# Patient Record
Sex: MALE | Race: WHITE | URBAN - METROPOLITAN AREA
[De-identification: names, ages, dates, MRNs, and addresses within clinical notes are randomized per-mention and may not be internally consistent; named-entity substitution may affect disease eponyms.]

---

## 2024-05-28 ENCOUNTER — OFFICE VISIT (OUTPATIENT)
Dept: ORTHOPEDIC SURGERY | Age: 59
End: 2024-05-28
Payer: COMMERCIAL

## 2024-05-28 VITALS — HEIGHT: 75 IN | BODY MASS INDEX: 34.82 KG/M2 | WEIGHT: 280 LBS

## 2024-05-28 DIAGNOSIS — S89.91XA INJURY OF RIGHT KNEE, INITIAL ENCOUNTER: Primary | ICD-10-CM

## 2024-05-28 DIAGNOSIS — S76.111A QUADRICEPS TENDON RUPTURE, RIGHT, INITIAL ENCOUNTER: ICD-10-CM

## 2024-05-28 DIAGNOSIS — S76.109A INJURY OF QUADRICEPS TENDON: ICD-10-CM

## 2024-05-28 PROCEDURE — 99204 OFFICE O/P NEW MOD 45 MIN: CPT | Performed by: ORTHOPAEDIC SURGERY

## 2024-05-28 RX ORDER — AMOXICILLIN AND CLAVULANATE POTASSIUM 875; 125 MG/1; MG/1
1 TABLET, FILM COATED ORAL 2 TIMES DAILY
Qty: 28 TABLET | Refills: 0 | Status: SHIPPED | OUTPATIENT
Start: 2024-05-28 | End: 2024-06-11

## 2024-05-28 NOTE — PROGRESS NOTES
Name: Juan Briggs  YOB: 1965  Gender: male  MRN: [id number]    CC: Right knee injury    HPI:   2024: Right knee injury  2024: [facility] ED  2024: Initial visit: Right knee injury     ROS/Meds/PSH/PMH/FH/SH: reviewed today    Tobacco:  reports that he has never smoked. He has never used smokeless tobacco.     Physical Examination:  Patient appears to be alert and oriented with acceptable appearance.  No obvious distress or SOB  CV: appears to have acceptable vascular color and capillary refill  Neuro: appears to have mostly intact light touch sensation   Skin: Right = anterior knee patella soft tissue swelling; mild redness   MS: Standing: Plantigrade: Gait protected  Right = quadriceps tendon deficiency; palpable gap; unable to independently straighten his knee    XR: No new x-rays taken today  XR Impression:  As above      Reviewed Test/Records/Documents:   2024: [facility] ED: Note reviewed  2024: [facility] ED: Left knee x-ray: Radiology impression: No evidence of acute bony abnormality: New prepatellar soft tissue swelling: No evidence of acute patella fracture: No dislocation patellofemoral groove  2024: Right knee ultrasound: Radiology impression: Normal sonographic appearance of visualized structures: No obvious patella tendon disruption or rupture     Assessment:    Right knee injury: Quadriceps tendon disruption    Plan:   The patient and I discussed the above assessment. We explored treatment options.     He unfortunately has a quadriceps tendon disruption that needs surgical reconstruction  With this anterior knee bursal swelling and mild redness, prescribed antibiotics as a precaution    Advanced medical imaging: Right knee MRI scan: Stat: Assess quadriceps tendon injury/tear    DME: He has crutches and knee immobilizer  We discussed knee care and protection  PT: Will need a postop   Handicap parkin months  Medication - OTC meds

## 2024-05-30 ENCOUNTER — OFFICE VISIT (OUTPATIENT)
Dept: ORTHOPEDIC SURGERY | Age: 59
End: 2024-05-30
Payer: COMMERCIAL

## 2024-05-30 DIAGNOSIS — S89.91XA INJURY OF RIGHT KNEE, INITIAL ENCOUNTER: ICD-10-CM

## 2024-05-30 DIAGNOSIS — S76.111A QUADRICEPS TENDON RUPTURE, RIGHT, INITIAL ENCOUNTER: Primary | ICD-10-CM

## 2024-05-30 DIAGNOSIS — S76.109A INJURY OF QUADRICEPS TENDON: ICD-10-CM

## 2024-05-30 PROCEDURE — 99204 OFFICE O/P NEW MOD 45 MIN: CPT | Performed by: ORTHOPAEDIC SURGERY

## 2024-05-30 PROCEDURE — L1833 KO ADJ JNT POS R SUP PRE OTS: HCPCS | Performed by: ORTHOPAEDIC SURGERY

## 2024-05-30 NOTE — PROGRESS NOTES
Name: Juan Briggs  YOB: 1965  Gender: male  MRN: [id number]      CC: Knee Pain (R)       HPI: Juan Briggs is a 58 y.o. male who is referred by Dr. Briscoe for surgical consultation of his right knee.  Suffered a fall 4 days ago had acute pop in the anterior aspect of his right knee inability to walk had some swelling.  He was seen at [facility] had an ultrasound was diagnosed with a partial patellar tendon or quad tendon rupture.  He was seen by Dr. Briscoe had concern for quad tendon rupture so he was sent for an MRI.  Presents today for definitive management.      Current Outpatient Medications:     amoxicillin-clavulanate (AUGMENTIN) 875-125 MG per tablet, Take 1 tablet by mouth 2 times daily for 14 days, Disp: 28 tablet, Rfl: 0  No Known Allergies  No past medical history on file.  No past surgical history on file.  No family history on file.  Social History     Socioeconomic History    Marital status: Unknown     Spouse name: Not on file    Number of children: Not on file    Years of education: Not on file    Highest education level: Not on file   Occupational History    Not on file   Tobacco Use    Smoking status: Never    Smokeless tobacco: Never   Substance and Sexual Activity    Alcohol use: Not on file    Drug use: Not on file    Sexual activity: Not on file   Other Topics Concern    Not on file   Social History Narrative    Not on file     Social Determinants of Health     Financial Resource Strain: Not on file   Food Insecurity: Not on file   Transportation Needs: Not on file   Physical Activity: Not on file   Stress: Not on file   Social Connections: Not on file   Intimate Partner Violence: Not on file   Housing Stability: Not on file            Physical Examination:  General: no acute distress  HEENT NCAT  Lungs: breathing easily  CV: regular rhythm by pulse  Abd: soft ND  Right Knee: Large effusion right knee palpable defect quadriceps insertion.  Large extensor lag with

## 2024-05-30 NOTE — H&P (VIEW-ONLY)
Name: Juan Briggs  YOB: 1965  Gender: male  MRN: [id number]      CC: Knee Pain (R)       HPI: Juan Briggs is a 58 y.o. male who is referred by Dr. Briscoe for surgical consultation of his right knee.  Suffered a fall 4 days ago had acute pop in the anterior aspect of his right knee inability to walk had some swelling.  He was seen at [facility] had an ultrasound was diagnosed with a partial patellar tendon or quad tendon rupture.  He was seen by Dr. Briscoe had concern for quad tendon rupture so he was sent for an MRI.  Presents today for definitive management.      Current Outpatient Medications:     amoxicillin-clavulanate (AUGMENTIN) 875-125 MG per tablet, Take 1 tablet by mouth 2 times daily for 14 days, Disp: 28 tablet, Rfl: 0  No Known Allergies  No past medical history on file.  No past surgical history on file.  No family history on file.  Social History     Socioeconomic History    Marital status: Unknown     Spouse name: Not on file    Number of children: Not on file    Years of education: Not on file    Highest education level: Not on file   Occupational History    Not on file   Tobacco Use    Smoking status: Never    Smokeless tobacco: Never   Substance and Sexual Activity    Alcohol use: Not on file    Drug use: Not on file    Sexual activity: Not on file   Other Topics Concern    Not on file   Social History Narrative    Not on file     Social Determinants of Health     Financial Resource Strain: Not on file   Food Insecurity: Not on file   Transportation Needs: Not on file   Physical Activity: Not on file   Stress: Not on file   Social Connections: Not on file   Intimate Partner Violence: Not on file   Housing Stability: Not on file            Physical Examination:  General: no acute distress  HEENT NCAT  Lungs: breathing easily  CV: regular rhythm by pulse  Abd: soft ND  Right Knee: Large effusion right knee palpable defect quadriceps insertion.  Large extensor lag with

## 2024-05-30 NOTE — PROGRESS NOTES
The brace was properly aligned medially and laterally along the length of the right Knee with the extension/flexion dials placed slightly above the patella (to insure that if brace slides down slightly the dials will still line up on either side of the patella/knee region). The brace is extended/shorten to the patient's leg length and to insure proper fit of the brace. The straps are tightened starting with the most distal strap and then strap proximal to the patella. The strap right below the patella is then secured and last is the strap highest on the quad. The straps are then trimmed for easier tightening of the brace for the patient. Patient read and signed documenting they understand and agree to [facility]'s current DME return policy.

## 2024-05-31 DIAGNOSIS — S76.109A INJURY OF QUADRICEPS TENDON: ICD-10-CM

## 2024-05-31 DIAGNOSIS — S76.111A QUADRICEPS MUSCLE RUPTURE, RIGHT, INITIAL ENCOUNTER: ICD-10-CM

## 2024-05-31 DIAGNOSIS — S89.91XA INJURY OF RIGHT KNEE, INITIAL ENCOUNTER: ICD-10-CM

## 2024-05-31 DIAGNOSIS — S76.111A QUADRICEPS TENDON RUPTURE, RIGHT, INITIAL ENCOUNTER: Primary | ICD-10-CM

## 2024-05-31 DIAGNOSIS — S89.91XA INJURY OF KNEE, RIGHT, INITIAL ENCOUNTER: ICD-10-CM

## 2024-05-31 NOTE — PRE-PROCEDURE INSTRUCTIONS
Patient verified name and .  Order for consent not found in EHR and matches case posting; patient verifies procedure.   Type lB surgery, phone assessment complete.  Orders not received.  Labs per surgeon: none at present time  Labs per anesthesia protocol: none    Patient answered medical/surgical history questions at their best of ability. All prior to admission medications documented in EPIC.    Patient instructed to continue taking all prescription medications up to the day of surgery but to take only the following medications the day of surgery according to anesthesia guidelines with a small sip of water: Augmentin   Also, patient is requested to take 2 Tylenol in the morning and then again before bed on the day before surgery. Regular or extra strength may be used.       Patient informed that all vitamins and supplements should be held 7 days prior to surgery and NSAIDS 5 days prior to surgery.   Prescription meds to hold:none    Patient instructed on the following:    > Arrive at [facility] Entrance, time of arrival to be called the day before by 1700  > NPO after midnight, unless otherwise indicated, including gum, mints, and ice chips  > Responsible adult must drive patient to the hospital, stay during surgery, and patient will need supervision 24 hours after anesthesia  > Use non moisturizing soap in shower the night before surgery and on the morning of surgery  > All piercings must be removed prior to arrival.    > Leave all valuables (money and jewelry) at home but bring insurance card and ID on DOS.   > You may be required to pay a deductible or co-pay on the day of your procedure. You can pre-pay by calling [phone] if your surgery is at the [facility] or [phone] if your surgery is at the [facility].  > Do not wear make-up, nail polish, lotions, cologne, perfumes, powders, or oil on skin. Artificial nails are not permitted.

## 2024-06-03 DIAGNOSIS — S76.111A QUADRICEPS TENDON RUPTURE, RIGHT, INITIAL ENCOUNTER: Primary | ICD-10-CM

## 2024-06-03 RX ORDER — OXYCODONE HYDROCHLORIDE 5 MG/1
5 TABLET ORAL EVERY 4 HOURS PRN
Qty: 30 TABLET | Refills: 0 | Status: SHIPPED | OUTPATIENT
Start: 2024-06-03 | End: 2024-06-08

## 2024-06-04 ENCOUNTER — ANESTHESIA EVENT (OUTPATIENT)
Dept: SURGERY | Age: 59
End: 2024-06-04
Payer: COMMERCIAL

## 2024-06-04 NOTE — PERIOP NOTE
Preop department called to notify patient of arrival time for scheduled procedure. Instructions given to   - Arrive at OPC Entrance [address].  - Remain NPO after midnight, unless otherwise indicated, including gum, mints, and ice chips.   - Have a responsible adult to drive patient to the hospital, stay during surgery, and patient will need supervision 24 hours after anesthesia.   - Use antibacterial soap in shower the night before surgery and on the morning of surgery.       Was patient contacted: yes  Voicemail left:   Numbers contacted: [phone]   Arrival time: 0600

## 2024-06-05 ENCOUNTER — HOSPITAL ENCOUNTER (OUTPATIENT)
Age: 59
Setting detail: OUTPATIENT SURGERY
Discharge: HOME | End: 2024-06-05
Attending: ORTHOPAEDIC SURGERY | Admitting: ORTHOPAEDIC SURGERY
Payer: COMMERCIAL

## 2024-06-05 ENCOUNTER — ANESTHESIA (OUTPATIENT)
Dept: SURGERY | Age: 59
End: 2024-06-05
Payer: COMMERCIAL

## 2024-06-05 VITALS
HEART RATE: 71 BPM | BODY MASS INDEX: 34.82 KG/M2 | TEMPERATURE: 98.2 F | OXYGEN SATURATION: 94 % | SYSTOLIC BLOOD PRESSURE: 164 MMHG | DIASTOLIC BLOOD PRESSURE: 82 MMHG | WEIGHT: 280 LBS | HEIGHT: 75 IN | RESPIRATION RATE: 12 BRPM

## 2024-06-05 PROCEDURE — 7100000000 HC PACU RECOVERY - FIRST 15 MIN: Performed by: ORTHOPAEDIC SURGERY

## 2024-06-05 PROCEDURE — 6360000002 HC RX W HCPCS

## 2024-06-05 PROCEDURE — 6360000002 HC RX W HCPCS: Performed by: ORTHOPAEDIC SURGERY

## 2024-06-05 PROCEDURE — 6370000000 HC RX 637 (ALT 250 FOR IP): Performed by: ANESTHESIOLOGY

## 2024-06-05 PROCEDURE — 7100000001 HC PACU RECOVERY - ADDTL 15 MIN: Performed by: ORTHOPAEDIC SURGERY

## 2024-06-05 PROCEDURE — 6360000002 HC RX W HCPCS: Performed by: PHYSICIAN ASSISTANT

## 2024-06-05 PROCEDURE — 2580000003 HC RX 258: Performed by: ANESTHESIOLOGY

## 2024-06-05 PROCEDURE — 3700000001 HC ADD 15 MINUTES (ANESTHESIA): Performed by: ORTHOPAEDIC SURGERY

## 2024-06-05 PROCEDURE — 2709999900 HC NON-CHARGEABLE SUPPLY: Performed by: ORTHOPAEDIC SURGERY

## 2024-06-05 PROCEDURE — 3600000014 HC SURGERY LEVEL 4 ADDTL 15MIN: Performed by: ORTHOPAEDIC SURGERY

## 2024-06-05 PROCEDURE — 3700000000 HC ANESTHESIA ATTENDED CARE: Performed by: ORTHOPAEDIC SURGERY

## 2024-06-05 PROCEDURE — 6360000002 HC RX W HCPCS: Performed by: ANESTHESIOLOGY

## 2024-06-05 PROCEDURE — 3600000004 HC SURGERY LEVEL 4 BASE: Performed by: ORTHOPAEDIC SURGERY

## 2024-06-05 PROCEDURE — 7100000010 HC PHASE II RECOVERY - FIRST 15 MIN: Performed by: ORTHOPAEDIC SURGERY

## 2024-06-05 PROCEDURE — 2500000003 HC RX 250 WO HCPCS

## 2024-06-05 PROCEDURE — C1769 GUIDE WIRE: HCPCS | Performed by: ORTHOPAEDIC SURGERY

## 2024-06-05 PROCEDURE — 7100000011 HC PHASE II RECOVERY - ADDTL 15 MIN: Performed by: ORTHOPAEDIC SURGERY

## 2024-06-05 RX ORDER — HYDROMORPHONE HYDROCHLORIDE 2 MG/ML
INJECTION, SOLUTION INTRAMUSCULAR; INTRAVENOUS; SUBCUTANEOUS PRN
Status: DISCONTINUED | OUTPATIENT
Start: 2024-06-05 | End: 2024-06-05 | Stop reason: SDUPTHER

## 2024-06-05 RX ORDER — ROPIVACAINE HYDROCHLORIDE 5 MG/ML
INJECTION, SOLUTION EPIDURAL; INFILTRATION; PERINEURAL PRN
Status: DISCONTINUED | OUTPATIENT
Start: 2024-06-05 | End: 2024-06-05 | Stop reason: ALTCHOICE

## 2024-06-05 RX ORDER — SODIUM CHLORIDE 0.9 % (FLUSH) 0.9 %
5-40 SYRINGE (ML) INJECTION EVERY 12 HOURS SCHEDULED
Status: DISCONTINUED | OUTPATIENT
Start: 2024-06-05 | End: 2024-06-05 | Stop reason: HOSPADM

## 2024-06-05 RX ORDER — SODIUM CHLORIDE 9 MG/ML
INJECTION, SOLUTION INTRAVENOUS PRN
Status: DISCONTINUED | OUTPATIENT
Start: 2024-06-05 | End: 2024-06-05 | Stop reason: HOSPADM

## 2024-06-05 RX ORDER — OXYCODONE HYDROCHLORIDE 5 MG/1
5 TABLET ORAL PRN
Status: COMPLETED | OUTPATIENT
Start: 2024-06-05 | End: 2024-06-05

## 2024-06-05 RX ORDER — HYDROMORPHONE HYDROCHLORIDE 2 MG/ML
0.5 INJECTION, SOLUTION INTRAMUSCULAR; INTRAVENOUS; SUBCUTANEOUS EVERY 5 MIN PRN
Status: DISCONTINUED | OUTPATIENT
Start: 2024-06-05 | End: 2024-06-05 | Stop reason: HOSPADM

## 2024-06-05 RX ORDER — ONDANSETRON 2 MG/ML
INJECTION INTRAMUSCULAR; INTRAVENOUS PRN
Status: DISCONTINUED | OUTPATIENT
Start: 2024-06-05 | End: 2024-06-05 | Stop reason: SDUPTHER

## 2024-06-05 RX ORDER — SODIUM CHLORIDE, SODIUM LACTATE, POTASSIUM CHLORIDE, CALCIUM CHLORIDE 600; 310; 30; 20 MG/100ML; MG/100ML; MG/100ML; MG/100ML
INJECTION, SOLUTION INTRAVENOUS CONTINUOUS
Status: DISCONTINUED | OUTPATIENT
Start: 2024-06-05 | End: 2024-06-05 | Stop reason: HOSPADM

## 2024-06-05 RX ORDER — ACETAMINOPHEN 500 MG
1000 TABLET ORAL ONCE
Status: COMPLETED | OUTPATIENT
Start: 2024-06-05 | End: 2024-06-05

## 2024-06-05 RX ORDER — OXYCODONE HYDROCHLORIDE 5 MG/1
10 TABLET ORAL PRN
Status: COMPLETED | OUTPATIENT
Start: 2024-06-05 | End: 2024-06-05

## 2024-06-05 RX ORDER — SODIUM CHLORIDE 0.9 % (FLUSH) 0.9 %
5-40 SYRINGE (ML) INJECTION PRN
Status: DISCONTINUED | OUTPATIENT
Start: 2024-06-05 | End: 2024-06-05 | Stop reason: HOSPADM

## 2024-06-05 RX ORDER — ONDANSETRON 2 MG/ML
4 INJECTION INTRAMUSCULAR; INTRAVENOUS
Status: COMPLETED | OUTPATIENT
Start: 2024-06-05 | End: 2024-06-05

## 2024-06-05 RX ORDER — SODIUM CHLORIDE 9 MG/ML
INJECTION, SOLUTION INTRAVENOUS CONTINUOUS
Status: DISCONTINUED | OUTPATIENT
Start: 2024-06-05 | End: 2024-06-05 | Stop reason: HOSPADM

## 2024-06-05 RX ORDER — DEXAMETHASONE SODIUM PHOSPHATE 4 MG/ML
INJECTION, SOLUTION INTRA-ARTICULAR; INTRALESIONAL; INTRAMUSCULAR; INTRAVENOUS; SOFT TISSUE PRN
Status: DISCONTINUED | OUTPATIENT
Start: 2024-06-05 | End: 2024-06-05 | Stop reason: SDUPTHER

## 2024-06-05 RX ORDER — LIDOCAINE HYDROCHLORIDE 20 MG/ML
INJECTION, SOLUTION EPIDURAL; INFILTRATION; INTRACAUDAL; PERINEURAL PRN
Status: DISCONTINUED | OUTPATIENT
Start: 2024-06-05 | End: 2024-06-05 | Stop reason: SDUPTHER

## 2024-06-05 RX ORDER — TRANEXAMIC ACID 100 MG/ML
INJECTION, SOLUTION INTRAVENOUS PRN
Status: DISCONTINUED | OUTPATIENT
Start: 2024-06-05 | End: 2024-06-05 | Stop reason: SDUPTHER

## 2024-06-05 RX ORDER — NALOXONE HYDROCHLORIDE 0.4 MG/ML
INJECTION, SOLUTION INTRAMUSCULAR; INTRAVENOUS; SUBCUTANEOUS PRN
Status: DISCONTINUED | OUTPATIENT
Start: 2024-06-05 | End: 2024-06-05 | Stop reason: HOSPADM

## 2024-06-05 RX ORDER — ROCURONIUM BROMIDE 10 MG/ML
INJECTION, SOLUTION INTRAVENOUS PRN
Status: DISCONTINUED | OUTPATIENT
Start: 2024-06-05 | End: 2024-06-05 | Stop reason: SDUPTHER

## 2024-06-05 RX ORDER — MIDAZOLAM HYDROCHLORIDE 2 MG/2ML
2 INJECTION, SOLUTION INTRAMUSCULAR; INTRAVENOUS
Status: DISCONTINUED | OUTPATIENT
Start: 2024-06-05 | End: 2024-06-05 | Stop reason: HOSPADM

## 2024-06-05 RX ORDER — GLYCOPYRROLATE 0.2 MG/ML
INJECTION INTRAMUSCULAR; INTRAVENOUS PRN
Status: DISCONTINUED | OUTPATIENT
Start: 2024-06-05 | End: 2024-06-05 | Stop reason: SDUPTHER

## 2024-06-05 RX ORDER — MIDAZOLAM HYDROCHLORIDE 1 MG/ML
INJECTION INTRAMUSCULAR; INTRAVENOUS PRN
Status: DISCONTINUED | OUTPATIENT
Start: 2024-06-05 | End: 2024-06-05 | Stop reason: SDUPTHER

## 2024-06-05 RX ORDER — LIDOCAINE HYDROCHLORIDE 10 MG/ML
1 INJECTION, SOLUTION INFILTRATION; PERINEURAL
Status: DISCONTINUED | OUTPATIENT
Start: 2024-06-05 | End: 2024-06-05 | Stop reason: HOSPADM

## 2024-06-05 RX ORDER — KETAMINE HYDROCHLORIDE 50 MG/ML
INJECTION, SOLUTION INTRAMUSCULAR; INTRAVENOUS PRN
Status: DISCONTINUED | OUTPATIENT
Start: 2024-06-05 | End: 2024-06-05 | Stop reason: SDUPTHER

## 2024-06-05 RX ORDER — PROCHLORPERAZINE EDISYLATE 5 MG/ML
5 INJECTION INTRAMUSCULAR; INTRAVENOUS
Status: DISCONTINUED | OUTPATIENT
Start: 2024-06-05 | End: 2024-06-05 | Stop reason: HOSPADM

## 2024-06-05 RX ORDER — DIPHENHYDRAMINE HYDROCHLORIDE 50 MG/ML
12.5 INJECTION INTRAMUSCULAR; INTRAVENOUS
Status: DISCONTINUED | OUTPATIENT
Start: 2024-06-05 | End: 2024-06-05 | Stop reason: HOSPADM

## 2024-06-05 RX ORDER — PROPOFOL 10 MG/ML
INJECTION, EMULSION INTRAVENOUS PRN
Status: DISCONTINUED | OUTPATIENT
Start: 2024-06-05 | End: 2024-06-05 | Stop reason: SDUPTHER

## 2024-06-05 RX ORDER — NEOSTIGMINE METHYLSULFATE 1 MG/ML
INJECTION, SOLUTION INTRAVENOUS PRN
Status: DISCONTINUED | OUTPATIENT
Start: 2024-06-05 | End: 2024-06-05 | Stop reason: SDUPTHER

## 2024-06-05 RX ADMIN — PROPOFOL 200 MG: 10 INJECTION, EMULSION INTRAVENOUS at 07:44

## 2024-06-05 RX ADMIN — SODIUM CHLORIDE, SODIUM LACTATE, POTASSIUM CHLORIDE, AND CALCIUM CHLORIDE: 600; 310; 30; 20 INJECTION, SOLUTION INTRAVENOUS at 06:33

## 2024-06-05 RX ADMIN — ROCURONIUM BROMIDE 30 MG: 50 INJECTION, SOLUTION INTRAVENOUS at 07:44

## 2024-06-05 RX ADMIN — HYDROMORPHONE HYDROCHLORIDE 0.4 MG: 2 INJECTION INTRAMUSCULAR; INTRAVENOUS; SUBCUTANEOUS at 07:40

## 2024-06-05 RX ADMIN — GLYCOPYRROLATE 0.8 MG: 0.2 INJECTION INTRAMUSCULAR; INTRAVENOUS at 08:42

## 2024-06-05 RX ADMIN — OXYCODONE HYDROCHLORIDE 5 MG: 5 TABLET ORAL at 09:51

## 2024-06-05 RX ADMIN — ONDANSETRON 4 MG: 2 INJECTION INTRAMUSCULAR; INTRAVENOUS at 08:42

## 2024-06-05 RX ADMIN — HYDROMORPHONE HYDROCHLORIDE 0.4 MG: 2 INJECTION INTRAMUSCULAR; INTRAVENOUS; SUBCUTANEOUS at 07:55

## 2024-06-05 RX ADMIN — ONDANSETRON 4 MG: 2 INJECTION INTRAMUSCULAR; INTRAVENOUS at 10:23

## 2024-06-05 RX ADMIN — KETAMINE HYDROCHLORIDE 10 MG: 50 INJECTION, SOLUTION INTRAMUSCULAR; INTRAVENOUS at 07:55

## 2024-06-05 RX ADMIN — Medication 5 MG: at 08:42

## 2024-06-05 RX ADMIN — LIDOCAINE HYDROCHLORIDE 60 MG: 20 INJECTION, SOLUTION EPIDURAL; INFILTRATION; INTRACAUDAL; PERINEURAL at 07:44

## 2024-06-05 RX ADMIN — ACETAMINOPHEN 1000 MG: 500 TABLET, FILM COATED ORAL at 06:29

## 2024-06-05 RX ADMIN — TRANEXAMIC ACID 1000 MG: 100 INJECTION, SOLUTION INTRAVENOUS at 07:56

## 2024-06-05 RX ADMIN — PROPOFOL 50 MG: 10 INJECTION, EMULSION INTRAVENOUS at 08:42

## 2024-06-05 RX ADMIN — Medication 3000 MG: at 07:48

## 2024-06-05 RX ADMIN — SODIUM CHLORIDE, SODIUM LACTATE, POTASSIUM CHLORIDE, AND CALCIUM CHLORIDE: 600; 310; 30; 20 INJECTION, SOLUTION INTRAVENOUS at 07:36

## 2024-06-05 RX ADMIN — HYDROMORPHONE HYDROCHLORIDE 0.6 MG: 2 INJECTION INTRAMUSCULAR; INTRAVENOUS; SUBCUTANEOUS at 08:04

## 2024-06-05 RX ADMIN — MIDAZOLAM 2 MG: 1 INJECTION INTRAMUSCULAR; INTRAVENOUS at 07:36

## 2024-06-05 RX ADMIN — KETAMINE HYDROCHLORIDE 10 MG: 50 INJECTION, SOLUTION INTRAMUSCULAR; INTRAVENOUS at 08:00

## 2024-06-05 RX ADMIN — DEXAMETHASONE SODIUM PHOSPHATE 4 MG: 4 INJECTION, SOLUTION INTRAMUSCULAR; INTRAVENOUS at 07:48

## 2024-06-05 RX ADMIN — HYDROMORPHONE HYDROCHLORIDE 0.2 MG: 2 INJECTION INTRAMUSCULAR; INTRAVENOUS; SUBCUTANEOUS at 08:35

## 2024-06-05 RX ADMIN — KETAMINE HYDROCHLORIDE 20 MG: 50 INJECTION, SOLUTION INTRAMUSCULAR; INTRAVENOUS at 07:44

## 2024-06-05 ASSESSMENT — LIFESTYLE VARIABLES: SMOKING_STATUS: 0

## 2024-06-05 ASSESSMENT — PAIN SCALES - GENERAL: PAINLEVEL_OUTOF10: 0

## 2024-06-05 NOTE — ANESTHESIA PRE PROCEDURE
Department of Anesthesiology  Preprocedure Note       Name:  Juan Briggs   Age:  58 y.o.  :  1965                                          MRN:  [id number]         Date:  2024      Surgeon: Surgeon(s):  Giovanni Ugarte MD    Procedure: Procedure(s):  RIGHT QUADRICEPS TENDON RUPTURE REPAIR     SUPINE    Medications prior to admission:   Prior to Admission medications    Medication Sig Start Date End Date Taking? Authorizing Provider   oxyCODONE (ROXICODONE) 5 MG immediate release tablet Take 1 tablet by mouth every 4 hours as needed for Pain for up to 5 days. Intended supply: 5 days. Take lowest dose possible to manage pain Max Daily Amount: 30 mg  Patient not taking: Reported on 2024 6/3/24 6/8/24  Kiki Florian PA   amoxicillin-clavulanate (AUGMENTIN) 875-125 MG per tablet Take 1 tablet by mouth 2 times daily for 14 days 24  Bethel Briscoe MD       Current medications:    Current Facility-Administered Medications   Medication Dose Route Frequency Provider Last Rate Last Admin    sodium chloride flush 0.9 % injection 5-40 mL  5-40 mL IntraVENous 2 times per day Kiki Florian PA        sodium chloride flush 0.9 % injection 5-40 mL  5-40 mL IntraVENous PRN Kiki Florian PA        0.9 % sodium chloride infusion   IntraVENous PRN Kiki Florian PA        0.9 % sodium chloride infusion   IntraVENous Continuous Kiki Florian PA        ceFAZolin (ANCEF) 3000 mg in sterile water 30 mL IV syringe  3,000 mg IntraVENous On Call to OR Kiki Florian PA        lidocaine 1 % injection 1 mL  1 mL IntraDERmal Once PRN Binh Boyd MD        lactated ringers IV soln infusion   IntraVENous Continuous Binh Boyd  mL/hr at 24 0659 NoRateChange at 24 0659    sodium chloride flush 0.9 % injection 5-40 mL  5-40 mL IntraVENous 2 times per day Binh Boyd MD        sodium chloride flush 0.9 % injection 5-40 mL  5-40 mL IntraVENous PRN

## 2024-06-05 NOTE — ANESTHESIA POSTPROCEDURE EVALUATION
Department of Anesthesiology  Postprocedure Note    Patient: Juan Briggs  MRN: [id number]  YOB: 1965  Date of evaluation: 6/5/2024    Procedure Summary       Date: 06/05/24 Room / Location: D OP OR 06 / [facility]    Anesthesia Start: 0736 Anesthesia Stop: 0913    Procedure: RIGHT QUADRICEPS TENDON REPAIR (Right: Knee) Diagnosis:       Injury of quadriceps tendon      Quadriceps muscle rupture, right, initial encounter      Injury of knee, right, initial encounter      (Injury of quadriceps tendon [S76.109A])      (Quadriceps muscle rupture, right, initial encounter [S76.111A])      (Injury of knee, right, initial encounter [S89.91XA])    Surgeons: Giovanni Ugarte MD Responsible Provider: Binh Boyd MD    Anesthesia Type: general ASA Status: 2            Anesthesia Type: No value filed.    Aquiles Phase I: Aquiles Score: 8    Aquiles Phase II: Aquiles Score: 10    Anesthesia Post Evaluation    Patient location during evaluation: PACU  Patient participation: complete - patient participated  Level of consciousness: awake and alert  Airway patency: patent  Nausea & Vomiting: no nausea and no vomiting  Cardiovascular status: hemodynamically stable  Respiratory status: acceptable, nonlabored ventilation and spontaneous ventilation  Hydration status: euvolemic  Comments: BP (!) 164/82   Pulse 71   Temp 98.2 °F (36.8 °C) (Temporal)   Resp 12   Ht 1.905 m (6' 3\")   Wt 127 kg (280 lb)   SpO2 94%   BMI 35.00 kg/m²     Multimodal analgesia pain management approach  Pain management: adequate and satisfactory to patient        No notable events documented.

## 2024-06-05 NOTE — OP NOTE
Operative Report    Patient: Juan Briggs MRN: [id number]  SSN: [id number]    YOB: 1965  Age: 58 y.o.  Sex: male       Date of Surgery: 6/5/24    Preoperative Diagnosis: Injury of quadriceps tendon [S76.109A]  Quadriceps muscle rupture, right, initial encounter [S76.111A]  Injury of knee, right, initial encounter [S89.91XA]     Postoperative Diagnosis: Same    Surgeon(s) and Role:     * Giovanni Ugarte MD - Primary    Assistant: Kiki Florian PA-C   The complexity of the surgery requires the use of a first assistant for retraction, fixation of the tendon, and assistance in closure.  COLEMAN Haskins was this assistant and was there for the entirety of the case.     Anesthesia: General     Procedure: Repair/reinsertion of right quadriceps tendon tear        Estimated Blood Loss:  100    Tourniquet Time: * No tourniquets in log *      Implants: #5 fiberwire            Specimens: * No specimens in log *        Drains: None                Complications: None    Counts: Sponge and needle counts were correct times two.       Procedure in detail:  After Informed consent was obtained surgical site was identified and marked in the preoperative area by myself.  Patient was brought to the operating room and general anesthesia was induced.  Right lower extremity was prepped and draped in usual orthopedic fashion and tourniquet was applied in the upper thigh.  Timeout was performed per protocol antibiotics were given per protocol.    Initially a midline longitudinal incision was made centered over the distal insertion of the quadriceps tendon on the patella.  Full-thickness flaps were carried down to the bursal tissue which was then incised to expose the quad tendon rupture.  Large amount of hemarthrosis and synovial fluid was evacuated using a suction.  This is a full-thickness rupture with retraction of just a couple of centimeters.  There was some chronic tendinopathy changes but overall

## 2024-06-05 NOTE — DISCHARGE INSTRUCTIONS
the ice machine frequently as instructed above. Place a thin layer of clothing or pillow case between ice pack and your skin. Ice for 20-30 minutes on and then 20-30 minutes off.        Third Post-Op Day:    MEDICATION:  Continue as instructed above.     BANDAGES:   (after 72 hours/3 days): Remove outer bandages. Leave adhesive bandage in place. As long as this is intact and not overly saturated it can stay on until your follow up appointment. You may shower, this dressing is waterproof. It is best to sit down in the shower to avoid slipping. Be sure to keep your knee as straight as possible and do not put weight on it if your brace is off. Replace brace after removing the outer dressing.     EXERCISES:    Continue exercises as noted above. Begin flat foot weight bearing (place foot flat on the ground and bear the weight of your leg only). The brace must be locked in extension (straight). It is only ok to bear weight on your leg if the brace is locked in extension. Use crutches for assistance    ICE:   Continue to ice frequently eight with the ice machine or regular ice pack. Do not put it directly on your skin. Place a thin layer of clothing or pillow case between ice pack and your skin. Ice for 20-30 minutes on and then 20-30 minutes off.  If you are awake and comfortable it is ok to use the ice machine more than 30 minutes at a time as long as you ensure it is not burning your skin.     PHYSICAL THERAPY APPOINTMENT:  Therapy typically begins around 2 weeks postoperatively        GENERAL INFORMATION:     KNEE RESPONSE TO SURGERY:  -Your knee and lower leg will be swollen.  -It may take 4 weeks or longer for the swelling to go away.  -It is also common to notice bruising around the thigh, knee and calf.       Call with any problems or questions. [phone] if you have any questions or problems. Please contact us immediately if you notice fever greater than 101 degrees F, excessive bleeding, or drainage from the

## 2024-06-05 NOTE — INTERVAL H&P NOTE
Update History & Physical    The Patient's History and Physical was reviewed   I discussed the surgery and patients medical condition with the patient.  The chart was reviewed with the patient and I examined the patient.    There was no change from previous note.  The surgical site was confirmed by the patient and me.    CV: RRR  RESP: CTAB    Plan:  The risk, benefits, expected outcome, and alternative to the recommended procedure have been discussed with the patient.  Patient understands and elects to proceed with the procedure as planned.    Electronically signed by Giovanni Ugarte MD on 06/05/24 7:03 AM

## 2024-06-07 ENCOUNTER — EVALUATION (OUTPATIENT)
Age: 59
End: 2024-06-07

## 2024-06-07 DIAGNOSIS — R26.9 IMPAIRED GAIT: ICD-10-CM

## 2024-06-07 DIAGNOSIS — M25.561 ACUTE PAIN OF RIGHT KNEE: ICD-10-CM

## 2024-06-07 DIAGNOSIS — Z74.09 IMPAIRED FUNCTIONAL MOBILITY AND ACTIVITY TOLERANCE: ICD-10-CM

## 2024-06-07 DIAGNOSIS — S76.109A INJURY OF QUADRICEPS TENDON: ICD-10-CM

## 2024-06-07 DIAGNOSIS — M62.81 MUSCLE WEAKNESS: ICD-10-CM

## 2024-06-07 DIAGNOSIS — M25.661 KNEE STIFFNESS, RIGHT: Primary | ICD-10-CM

## 2024-06-07 NOTE — PROGRESS NOTES
[facility]  [address] [place] [id number]  Dept: [phone]      Physical Therapy Initial Assessment     Referring MD: Giovanni Ugarte MD  Diagnosis:     ICD-10-CM    1. Knee stiffness, right  M25.661       2. Acute pain of right knee  M25.561       3. Muscle weakness  M62.81       4. Impaired gait  R26.9       5. Impaired functional mobility and activity tolerance  Z74.09          Surgery:Right Quadriceps Tendon Repair - Right  Date: 6/5/2024  Therapy precautions:Weight-bearing Status: in brace locked in EXT and ROM 0 to 50 degrees range of motion x2 weeks followed by progression of flexion of 10 degrees/week with a goal of 90 around 6 weeks  Co-morbidities affecting plan of care: none  Payor: Payor: [employer] /  /  /  Billing pattern: Commercial- substantial/midpoint time each CPT   Timed Procedure Codes min: 25, Total Time: 50  min  Modifier needed: No  Visit count: 1/30;  hard max    PERTINENT MEDICAL HISTORY     Past medical and surgical history:   No past medical history on file.   Past Surgical History:   Procedure Laterality Date    LEG MUSCLE SURGERY Right 6/5/2024    RIGHT QUADRICEPS TENDON REPAIR performed by Giovanni Ugarte MD at [facility]     Medications: reviewed in chart   Allergies: No Known Allergies         SUBJECTIVE     Chief complaints/history of injury:    Date symptoms began: 6/5/24  Nature of condition: Recent onset (initial onset within last 3 months)  Primary cause of current episode: Post-surgical  How did symptoms start: Pt reports falling from stoop on his front porch w/ resulting torn quad tendon.   He is now s/p R quad tendon repair on 6/5/24.    His sister is an OT, she is in town until Sunday       Received previous outpatient therapy? No    Pain Assessment:  Average Pain/symptom intensity (0-10 scale)  Last 24 hours: 2/10  Last week (1-7 days): 7/10  How often do you feel symptoms? Constant (% of time)  Description:

## 2024-06-10 ENCOUNTER — TREATMENT (OUTPATIENT)
Age: 59
End: 2024-06-10
Payer: COMMERCIAL

## 2024-06-10 DIAGNOSIS — M25.661 KNEE STIFFNESS, RIGHT: Primary | ICD-10-CM

## 2024-06-10 DIAGNOSIS — M62.81 MUSCLE WEAKNESS: ICD-10-CM

## 2024-06-10 DIAGNOSIS — M25.561 ACUTE PAIN OF RIGHT KNEE: ICD-10-CM

## 2024-06-10 PROCEDURE — 97140 MANUAL THERAPY 1/> REGIONS: CPT | Performed by: PHYSICAL THERAPIST

## 2024-06-10 PROCEDURE — 97110 THERAPEUTIC EXERCISES: CPT | Performed by: PHYSICAL THERAPIST

## 2024-06-10 PROCEDURE — 97016 VASOPNEUMATIC DEVICE THERAPY: CPT | Performed by: PHYSICAL THERAPIST

## 2024-06-10 NOTE — PROGRESS NOTES
[facility] - [facility]  [address] [place] [id number]  Dept: [phone]      Physical Therapy Daily Note     Referring MD: Giovanni Ugarte MD  Diagnosis:     ICD-10-CM    1. Knee stiffness, right  M25.661       2. Acute pain of right knee  M25.561       3. Muscle weakness  M62.81            Surgery:Right Quadriceps Tendon Repair - Right  Date: 6/5/2024  Therapy precautions:Weight-bearing Status: in brace locked in EXT and ROM 0 to 50 degrees range of motion x2 weeks followed by progression of flexion of 10 degrees/week with a goal of 90 around 6 weeks  Co-morbidities affecting plan of care: none  Payor: Payor: [employer] /  /  /  Billing pattern: Commercial- substantial/midpoint time each CPT   Timed Procedure Codes min: 40, Total Time: 55  min  Modifier needed: No  Visit count: 2/30;  hard max    Chief complaints/history of injury:    Date symptoms began: 6/5/24  Nature of condition: Recent onset (initial onset within last 3 months)  Primary cause of current episode: Post-surgical  How did symptoms start: Pt reports falling from stoop on his front porch w/ resulting torn quad tendon.   He is now s/p R quad tendon repair on 6/5/24.    His sister is an OT, she is in town until Sunday     Patient Stated Goals: normalize walking and return to gym    SUBJECTIVE     Pt reports he has noticed the brace is rubbing his lateral thigh and he has some blistering.  He states pain is well controlled and he has stopped the pain meds    Medications: no changes since last session      OBJECTIVE     6/10/24: Small blister on lateral quad/thigh from brace; this was readjusted to not apply pressure    Functional Outcome Questionnaire:     Lower Extremity Functional Scale: 7/80= 9% function     ROM Measures:  6/7/24   Right Left Comment   Knee Extension 5 0    Knee Flexion 30 130    Hip WFL WFL    Ankle WFL WFL            Strength/MMT (0-5 Scale):  Deferred d/t recent surgery     Right

## 2024-06-12 NOTE — PROGRESS NOTES
Name: Juan Briggs  YOB: 1965  Gender: male  MRN: [id number]    Procedure Performed: Repair/reinsertion of right quadriceps tendon tear     Date of Procedure: 6/5/24    Subjective: Returns 2 weeks status post the above procedure.  He is taking is no longer taking pain medicine at this time.  He has progressed well.    Physical Examination: Incisions clean dry and intact, no sign of infection. Motor and sensory intact.  Swelling present around the knee.  He has a couple degrees of hyperextension with about 50 degrees of flexion without any increased pain.  He does have some healing sores on the lateral aspect of his thigh from the brace.    Assessment:   1. Status post orthopedic surgery, follow-up exam       Plan:   Doing well.   Continue PT per  Weightbearing as tolerated in extension.  0 to 50 degrees range of motion x2 weeks followed by progression of flexion of 10 degrees/week with a goal of 90 around 6 weeks  Follow up in 4 weeks    I think he benefit from a compression leg sleeve due to the rubbing of the brace.

## 2024-06-13 ENCOUNTER — TREATMENT (OUTPATIENT)
Age: 59
End: 2024-06-13

## 2024-06-13 DIAGNOSIS — R26.9 IMPAIRED GAIT: ICD-10-CM

## 2024-06-13 DIAGNOSIS — M25.661 KNEE STIFFNESS, RIGHT: Primary | ICD-10-CM

## 2024-06-13 DIAGNOSIS — Z74.09 IMPAIRED FUNCTIONAL MOBILITY AND ACTIVITY TOLERANCE: ICD-10-CM

## 2024-06-13 DIAGNOSIS — M25.561 ACUTE PAIN OF RIGHT KNEE: ICD-10-CM

## 2024-06-13 DIAGNOSIS — M62.81 MUSCLE WEAKNESS: ICD-10-CM

## 2024-06-13 NOTE — PROGRESS NOTES
[facility] - [facility]  [address] [place] [id number]  Dept: [phone]      Physical Therapy Daily Note     Referring MD: Giovanni Ugarte MD  Diagnosis:     ICD-10-CM    1. Knee stiffness, right  M25.661       2. Acute pain of right knee  M25.561       3. Muscle weakness  M62.81       4. Impaired functional mobility and activity tolerance  Z74.09       5. Impaired gait  R26.9            Surgery:Right Quadriceps Tendon Repair - Right  Date: 6/5/2024  Therapy precautions:Weight-bearing Status: in brace locked in EXT and ROM 0 to 50 degrees range of motion x 2 weeks followed by progression of flexion of 10 degrees/week with a goal of 90 around 6 weeks  Co-morbidities affecting plan of care: none  Payor: Payor: [employer] /  /  /  Billing pattern: Commercial- substantial/midpoint time each CPT  Timed Procedure Codes: 53 min, Total Time: 63  min  Modifier needed: No  Time In: 03:47 PM  Time Out: 04:50 PM  Visit count: Visit count could not be calculated. Make sure you are using a visit which is associated with an episode./30;  hard max    Chief complaints/history of injury:    Date symptoms began: 6/5/24  Nature of condition: Recent onset (initial onset within last 3 months)  Primary cause of current episode: Post-surgical  How did symptoms start: Pt reports falling from stoop on his front porch w/ resulting torn quad tendon.   He is now s/p R quad tendon repair on 6/5/24.    His sister is an OT, she is in town until Sunday     Patient Stated Goals: normalize walking and return to gym    SUBJECTIVE     Pt reports he thinks he has the brace adjusted so that he is no longer developing a blister.    Medications: no changes since last session      OBJECTIVE     6/10/24: Small blister on lateral quad/thigh from brace; this was readjusted to not apply pressure    Functional Outcome Questionnaire:     Lower Extremity Functional Scale: 7/80= 9% function     ROM Measures:

## 2024-06-17 ENCOUNTER — OFFICE VISIT (OUTPATIENT)
Dept: ORTHOPEDIC SURGERY | Age: 59
End: 2024-06-17

## 2024-06-17 DIAGNOSIS — S76.111A QUADRICEPS TENDON RUPTURE, RIGHT, INITIAL ENCOUNTER: ICD-10-CM

## 2024-06-17 DIAGNOSIS — Z09 STATUS POST ORTHOPEDIC SURGERY, FOLLOW-UP EXAM: Primary | ICD-10-CM

## 2024-06-17 PROCEDURE — 99024 POSTOP FOLLOW-UP VISIT: CPT | Performed by: PHYSICIAN ASSISTANT

## 2024-06-17 PROCEDURE — M5038 MISC REPAREL LEG SLEEVE: HCPCS | Performed by: PHYSICIAN ASSISTANT

## 2024-06-17 NOTE — PROGRESS NOTES
The patient was prescribed and fitted with an [employer] Leg sleeve for the right side, size XL.     Patient read and signed documenting they understand and agree to [facility]'s current DME return policy.

## 2024-06-18 ENCOUNTER — TREATMENT (OUTPATIENT)
Age: 59
End: 2024-06-18
Payer: COMMERCIAL

## 2024-06-18 DIAGNOSIS — M62.81 MUSCLE WEAKNESS: ICD-10-CM

## 2024-06-18 DIAGNOSIS — M25.661 KNEE STIFFNESS, RIGHT: Primary | ICD-10-CM

## 2024-06-18 DIAGNOSIS — R26.9 IMPAIRED GAIT: ICD-10-CM

## 2024-06-18 DIAGNOSIS — M25.561 ACUTE PAIN OF RIGHT KNEE: ICD-10-CM

## 2024-06-18 DIAGNOSIS — Z74.09 IMPAIRED FUNCTIONAL MOBILITY AND ACTIVITY TOLERANCE: ICD-10-CM

## 2024-06-18 PROCEDURE — 97140 MANUAL THERAPY 1/> REGIONS: CPT | Performed by: PHYSICAL THERAPIST

## 2024-06-18 PROCEDURE — 97110 THERAPEUTIC EXERCISES: CPT | Performed by: PHYSICAL THERAPIST

## 2024-06-18 PROCEDURE — 97016 VASOPNEUMATIC DEVICE THERAPY: CPT | Performed by: PHYSICAL THERAPIST

## 2024-06-18 NOTE — PROGRESS NOTES
OF CARE   Progress flexion ROM to 60 degrees  Cont quad tendon repair protocol  progression of flexion of 10 degrees/week with a goal of 90 around 6 weeks; starting after 6/19      Effective Dates/Duration: 6/7/2024 TO 9/5/2024 (90 days).    Frequency: 2x/week       GOALS     Goals:  Short term goals to be met by 7/5/2024  (4 weeks):  Pt will demonstrate good recall of HEP requiring minimal verbal cuing for proper form and technique.  Increase knee AROM of involved LE to 75 degrees, in order to don shoes.  Pt will amb w/ single crutch or less w/ brace locked in EXT     Short term goals to be met by 7/19/2024  (6 weeks):  Increase knee AROM of involved LE to 90 degrees, in order to don shoes.  Pt will safely ambulate w/ no AD    Long term goals to be met by 9/5/2024 (90 days):  Demonstrate AROM of involved knee to be 5 degrees, allowing for ADLs with no restrictions related to knee stiffness.    Pt will increase LE strength to at least 85% LSI with HHD for improved stair climbing tessa.   Pt will resume sport and fitness activities including gym routine per MD guidelines.  Improve LEFS to 65/80 demonstrating no functional restrictions with ADLs, work.     [employer] [facility]  Access Code: [email or web address]  URL: [email or web address]  Date: 06/07/2024  Prepared by: Leni Blount    Exercises  - Supine Single Leg Ankle Pumps  - 6 x daily - 3 sets - 10 reps - 1s hold  - Long Sitting Calf Stretch with Strap  - 4 x daily - 6 reps - 10-15s hold  - Supine Quad Set  - 4 x daily - 2-4 sets - 10 reps - 5s hold  - Long Sitting Quad Set with Towel Roll Under Heel  - 4 x daily - 2-4 sets - 10 reps - 5s hold

## 2024-06-20 ENCOUNTER — TREATMENT (OUTPATIENT)
Age: 59
End: 2024-06-20

## 2024-06-20 DIAGNOSIS — Z74.09 IMPAIRED FUNCTIONAL MOBILITY AND ACTIVITY TOLERANCE: ICD-10-CM

## 2024-06-20 DIAGNOSIS — M25.661 KNEE STIFFNESS, RIGHT: Primary | ICD-10-CM

## 2024-06-20 DIAGNOSIS — M25.561 ACUTE PAIN OF RIGHT KNEE: ICD-10-CM

## 2024-06-20 DIAGNOSIS — M62.81 MUSCLE WEAKNESS: ICD-10-CM

## 2024-06-20 DIAGNOSIS — R26.9 IMPAIRED GAIT: ICD-10-CM

## 2024-06-20 NOTE — PROGRESS NOTES
[facility] - [facility]  [address] [place] [id number]  Dept: [phone]      Physical Therapy Daily Note     Referring MD: Giovanni Ugarte MD  Diagnosis:     ICD-10-CM    1. Knee stiffness, right  M25.661       2. Acute pain of right knee  M25.561       3. Muscle weakness  M62.81       4. Impaired functional mobility and activity tolerance  Z74.09       5. Impaired gait  R26.9              Surgery:Right Quadriceps Tendon Repair - Right  Date: 6/5/2024  Therapy precautions:Weight-bearing Status: in brace locked in EXT and ROM 0 to 50 degrees range of motion x 2 weeks followed by progression of flexion of 10 degrees/week with a goal of 90 around 6 weeks  Co-morbidities affecting plan of care: none  Payor: Payor: [employer] /  /  /  Billing pattern: Commercial- substantial/midpoint time each CPT  Timed Procedure Codes: 30 min, Total Time: 40  min  Modifier needed: No  Time In: 03:40 PM  Time Out: 04:20 PM    Visit count: 5/30;  hard max    Chief complaints/history of injury:    Date symptoms began: 6/5/24  Nature of condition: Recent onset (initial onset within last 3 months)  Primary cause of current episode: Post-surgical  How did symptoms start: Pt reports falling from stoop on his front porch w/ resulting torn quad tendon.   He is now s/p R quad tendon repair on 6/5/24.    His sister is an OT, she is in town until Sunday     Patient Stated Goals: normalize walking and return to gym    SUBJECTIVE     Pt reports no adverse events following his last therapy session.    Medications: no changes since last session      OBJECTIVE     Findings:  Right knee flexion PROM = 60°       Today's treatment consisted of:  Therapeutic exercise (18990) x 22 min to develop ROM, strength, endurance and flexibility of surgical knee.     Current Exercises Past Exercises (not performed today)   Slantboard Gastroc Stretch - 5x12\"  Long sitting heel slides to ? 60°  Seated knee flexion to ?

## 2024-06-25 ENCOUNTER — TREATMENT (OUTPATIENT)
Age: 59
End: 2024-06-25
Payer: COMMERCIAL

## 2024-06-25 DIAGNOSIS — M25.661 KNEE STIFFNESS, RIGHT: Primary | ICD-10-CM

## 2024-06-25 DIAGNOSIS — M25.561 ACUTE PAIN OF RIGHT KNEE: ICD-10-CM

## 2024-06-25 DIAGNOSIS — M62.81 MUSCLE WEAKNESS: ICD-10-CM

## 2024-06-25 PROCEDURE — 97110 THERAPEUTIC EXERCISES: CPT | Performed by: PHYSICAL THERAPIST

## 2024-06-25 PROCEDURE — 97140 MANUAL THERAPY 1/> REGIONS: CPT | Performed by: PHYSICAL THERAPIST

## 2024-06-25 PROCEDURE — 97016 VASOPNEUMATIC DEVICE THERAPY: CPT | Performed by: PHYSICAL THERAPIST

## 2024-06-25 NOTE — PROGRESS NOTES
[facility] - [facility]  [address] [place] [id number]  Dept: [phone]      Physical Therapy Daily Note     Referring MD: Giovanni Ugarte MD  Diagnosis:     ICD-10-CM    1. Knee stiffness, right  M25.661       2. Acute pain of right knee  M25.561       3. Muscle weakness  M62.81                Surgery:Right Quadriceps Tendon Repair - Right  Date: 6/5/2024  Therapy precautions:Weight-bearing Status: in brace locked in EXT and ROM 0 to 50 degrees range of motion x 2 weeks followed by progression of flexion of 10 degrees/week with a goal of 90 around 6 weeks  Co-morbidities affecting plan of care: none  Payor: Payor: [employer] /  /  /  Billing pattern: Commercial- substantial/midpoint time each CPT  Timed Procedure Codes: 40 min, Total Time: 50  min  Modifier needed: No    Visit count: 6/30;  hard max    Chief complaints/history of injury:    Date symptoms began: 6/5/24  Nature of condition: Recent onset (initial onset within last 3 months)  Primary cause of current episode: Post-surgical  How did symptoms start: Pt reports falling from stoop on his front porch w/ resulting torn quad tendon.   He is now s/p R quad tendon repair on 6/5/24.    His sister is an OT, she is in town until Sunday     Patient Stated Goals: normalize walking and return to gym    SUBJECTIVE     Pt reports pain is minimal and he is walking in brace w/ no AD confidently.     Medications: no changes since last session      OBJECTIVE     Findings:  Right knee flexion PROM = 60°       Today's treatment consisted of:  Therapeutic exercise (37362) x 30 min to develop ROM, strength, endurance and flexibility of surgical knee.     Current Exercises Past Exercises (not performed today)     Seated knee flex PROM to 70 deg  Standing SLR 30x  Standing 3-way hip  3x10  Knee EXT iso SAQ and LAQ at 60 deg 3' ea  TKE w/ grn band 2x30   QS w/ hip ABD supine 2x20  Patient Education:   Slantboard Gastroc Stretch -

## 2024-06-28 ENCOUNTER — TREATMENT (OUTPATIENT)
Age: 59
End: 2024-06-28
Payer: COMMERCIAL

## 2024-06-28 DIAGNOSIS — M62.81 MUSCLE WEAKNESS: ICD-10-CM

## 2024-06-28 DIAGNOSIS — M25.661 KNEE STIFFNESS, RIGHT: Primary | ICD-10-CM

## 2024-06-28 DIAGNOSIS — M25.561 ACUTE PAIN OF RIGHT KNEE: ICD-10-CM

## 2024-06-28 PROCEDURE — 97140 MANUAL THERAPY 1/> REGIONS: CPT | Performed by: PHYSICAL THERAPIST

## 2024-06-28 PROCEDURE — 97110 THERAPEUTIC EXERCISES: CPT | Performed by: PHYSICAL THERAPIST

## 2024-06-28 PROCEDURE — 97016 VASOPNEUMATIC DEVICE THERAPY: CPT | Performed by: PHYSICAL THERAPIST

## 2024-06-28 NOTE — PROGRESS NOTES
[facility] - [facility]  [address] [place] [id number]  Dept: [phone]      Physical Therapy Daily Note     Referring MD: Giovanni Ugarte MD  Diagnosis:     ICD-10-CM    1. Knee stiffness, right  M25.661       2. Acute pain of right knee  M25.561       3. Muscle weakness  M62.81                Surgery:Right Quadriceps Tendon Repair - Right  Date: 6/5/2024  Therapy precautions:Weight-bearing Status: in brace locked in EXT and ROM 0 to 50 degrees range of motion x 2 weeks followed by progression of flexion of 10 degrees/week with a goal of 90 around 6 weeks  Co-morbidities affecting plan of care: none  Payor: Payor: [employer] /  /  /  Billing pattern: Commercial- substantial/midpoint time each CPT  Timed Procedure Codes: 40 min, Total Time: 50  min  Modifier needed: No    Visit count: 7/30;  hard max    Chief complaints/history of injury:    Date symptoms began: 6/5/24  Nature of condition: Recent onset (initial onset within last 3 months)  Primary cause of current episode: Post-surgical  How did symptoms start: Pt reports falling from stoop on his front porch w/ resulting torn quad tendon.   He is now s/p R quad tendon repair on 6/5/24.    His sister is an OT, she is in town until Sunday     Patient Stated Goals: normalize walking and return to gym    SUBJECTIVE     Pt reports his SLR is improving and he has been feeling good w/ no pain c/o    Medications: no changes since last session      OBJECTIVE     Findings:  Right knee flexion PROM = 60°       Today's treatment consisted of:  Therapeutic exercise (48249) x 30 min to develop ROM, strength, endurance and flexibility of surgical knee.     Current Exercises Past Exercises (not performed today)   Wall sit quarter squat 4' total  S/l hip ABD 3x10  Knee EXT iso LAQ at 60 deg 10x20s  TKE w/ grn band 2x30   SLR in brace 3x10 3s/3s hold  Patient Education: update to HEP w/ moises  NV: add in HS work  Slantboard Gastroc

## 2024-07-02 ENCOUNTER — TREATMENT (OUTPATIENT)
Age: 59
End: 2024-07-02
Payer: COMMERCIAL

## 2024-07-02 DIAGNOSIS — M25.561 ACUTE PAIN OF RIGHT KNEE: ICD-10-CM

## 2024-07-02 DIAGNOSIS — M62.81 MUSCLE WEAKNESS: ICD-10-CM

## 2024-07-02 DIAGNOSIS — M25.661 KNEE STIFFNESS, RIGHT: Primary | ICD-10-CM

## 2024-07-02 PROCEDURE — 97140 MANUAL THERAPY 1/> REGIONS: CPT | Performed by: PHYSICAL THERAPIST

## 2024-07-02 PROCEDURE — 97110 THERAPEUTIC EXERCISES: CPT | Performed by: PHYSICAL THERAPIST

## 2024-07-02 PROCEDURE — 97016 VASOPNEUMATIC DEVICE THERAPY: CPT | Performed by: PHYSICAL THERAPIST

## 2024-07-02 NOTE — PROGRESS NOTES
[facility] - [facility]  [address] [place] [id number]  Dept: [phone]      Physical Therapy Daily Note     Referring MD: Giovanni Ugarte MD  Diagnosis:     ICD-10-CM    1. Knee stiffness, right  M25.661       2. Acute pain of right knee  M25.561       3. Muscle weakness  M62.81                Surgery:Right Quadriceps Tendon Repair - Right  Date: 6/5/2024  Therapy precautions:Weight-bearing Status: in brace locked in EXT and ROM 0 to 50 degrees range of motion x 2 weeks followed by progression of flexion of 10 degrees/week with a goal of 90 around 6 weeks  Co-morbidities affecting plan of care: none  Payor: Payor: [employer] /  /  /  Billing pattern: Commercial- substantial/midpoint time each CPT  Timed Procedure Codes: 40 min, Total Time: 50  min  Modifier needed: No    Visit count: 8/30;  hard max    Chief complaints/history of injury:    Date symptoms began: 6/5/24  Nature of condition: Recent onset (initial onset within last 3 months)  Primary cause of current episode: Post-surgical  How did symptoms start: Pt reports falling from stoop on his front porch w/ resulting torn quad tendon.   He is now s/p R quad tendon repair on 6/5/24.    His sister is an OT, she is in town until Sunday     Patient Stated Goals: normalize walking and return to gym    SUBJECTIVE     Pt reports no inc in pain following last session.  He has been working and did 12 hrs yesterday; overall has no pain c/o today    Medications: no changes since last session      OBJECTIVE     Functional Outcome Questionnaire:     Lower Extremity Functional Scale: 7/80= 9% function     ROM Measures:  7/2/24   Right Left Comment   Knee Extension 0 0    Knee Flexion 75  130    Hip WFL WFL    Ankle WFL WFL                Today's treatment consisted of:  Therapeutic exercise (27480) x 30 min to develop ROM, strength, endurance and flexibility of surgical knee.     Current Exercises Past Exercises (not

## 2024-07-09 ENCOUNTER — TREATMENT (OUTPATIENT)
Age: 59
End: 2024-07-09
Payer: COMMERCIAL

## 2024-07-09 DIAGNOSIS — R26.9 IMPAIRED GAIT: ICD-10-CM

## 2024-07-09 DIAGNOSIS — M25.661 KNEE STIFFNESS, RIGHT: Primary | ICD-10-CM

## 2024-07-09 DIAGNOSIS — M25.561 ACUTE PAIN OF RIGHT KNEE: ICD-10-CM

## 2024-07-09 PROCEDURE — 97110 THERAPEUTIC EXERCISES: CPT | Performed by: PHYSICAL THERAPIST

## 2024-07-09 PROCEDURE — 97140 MANUAL THERAPY 1/> REGIONS: CPT | Performed by: PHYSICAL THERAPIST

## 2024-07-09 PROCEDURE — 97016 VASOPNEUMATIC DEVICE THERAPY: CPT | Performed by: PHYSICAL THERAPIST

## 2024-07-09 NOTE — PROGRESS NOTES
[facility] - [facility]  [address] [place] [id number]  Dept: [phone]      Physical Therapy Daily Note     Referring MD: Giovanni Ugarte MD  Diagnosis:     ICD-10-CM    1. Knee stiffness, right  M25.661       2. Impaired gait  R26.9       3. Acute pain of right knee  M25.561                Surgery:Right Quadriceps Tendon Repair - Right  Date: 6/5/2024  Therapy precautions:Weight-bearing Status: in brace locked in EXT and ROM 0 to 50 degrees range of motion x 2 weeks followed by progression of flexion of 10 degrees/week with a goal of 90 around 6 weeks  Co-morbidities affecting plan of care: none  Payor: Payor: [employer] /  /  /  Billing pattern: Commercial- substantial/midpoint time each CPT  Timed Procedure Codes: 40 min, Total Time: 55  min  Modifier needed: No    Visit count: 9/30;  hard max    Chief complaints/history of injury:    Date symptoms began: 6/5/24  Nature of condition: Recent onset (initial onset within last 3 months)  Primary cause of current episode: Post-surgical  How did symptoms start: Pt reports falling from stoop on his front porch w/ resulting torn quad tendon.   He is now s/p R quad tendon repair on 6/5/24.    His sister is an OT, she is in town until Sunday     Patient Stated Goals: normalize walking and return to gym    SUBJECTIVE     Pt reports doing some walking w/ brace unlocked at home; pain is minimal and overall he feels weak but feels good about his recovery so far.    Medications: no changes since last session      OBJECTIVE     Functional Outcome Questionnaire:     Lower Extremity Functional Scale: 7/80= 9% function     ROM Measures:  7/9/24   Right Left Comment   Knee Extension 0 0    Knee Flexion 85 130    Hip WFL WFL    Ankle WFL WFL                Today's treatment consisted of:  Therapeutic exercise (92852) x 30 min to develop ROM, strength, endurance and flexibility of surgical knee.     Current Exercises Past Exercises

## 2024-07-16 ENCOUNTER — TREATMENT (OUTPATIENT)
Age: 59
End: 2024-07-16
Payer: COMMERCIAL

## 2024-07-16 DIAGNOSIS — M25.661 KNEE STIFFNESS, RIGHT: Primary | ICD-10-CM

## 2024-07-16 DIAGNOSIS — R26.9 IMPAIRED GAIT: ICD-10-CM

## 2024-07-16 DIAGNOSIS — M25.561 ACUTE PAIN OF RIGHT KNEE: ICD-10-CM

## 2024-07-16 DIAGNOSIS — M62.81 MUSCLE WEAKNESS: ICD-10-CM

## 2024-07-16 PROCEDURE — 97016 VASOPNEUMATIC DEVICE THERAPY: CPT | Performed by: PHYSICAL THERAPIST

## 2024-07-16 PROCEDURE — 97110 THERAPEUTIC EXERCISES: CPT | Performed by: PHYSICAL THERAPIST

## 2024-07-16 PROCEDURE — 97140 MANUAL THERAPY 1/> REGIONS: CPT | Performed by: PHYSICAL THERAPIST

## 2024-07-16 NOTE — PROGRESS NOTES
Name: Juan Briggs  YOB: 1965  Gender: male  MRN: [id number]      Procedure: Right Quadriceps Tendon Repair - Right    Surgery Date: 6/5/2024        Subjective: Returns 6 weeks status post knee arthroscopy.  Pain is controlled improving with motion.  He is back to 100 degrees.  Ambulating with the brace unlocked      Physical Examination: Incisions are healing well.  Able to activate quad but has appropriate atrophy.  Passive extension to about 2 degrees of hyperextension flexion to 100 degrees.  Patella is mobile.   Mild appropriate effusion.  Motor and sensory intact.           Assessment:   1. Status post orthopedic surgery, follow-up exam           Plan:     Progressing very well.  We discussed not doing too quickly.  He can progress with his flexion as tolerated at this point and slowly begin to wean out of the brace as physical therapy sees fit.  I will see him back in 4 to 6 weeks

## 2024-07-16 NOTE — PROGRESS NOTES
[facility] - [facility]  [address] [place] [id number]  Dept: [phone]      Physical Therapy Daily Note     Referring MD: Giovanni Ugarte MD  Diagnosis:     ICD-10-CM    1. Knee stiffness, right  M25.661       2. Impaired gait  R26.9       3. Acute pain of right knee  M25.561       4. Muscle weakness  M62.81                Surgery:Right Quadriceps Tendon Repair - Right  Date: 6/5/2024  Therapy precautions:Weight-bearing Status: in brace locked in EXT and ROM 0 to 50 degrees range of motion x 2 weeks followed by progression of flexion of 10 degrees/week with a goal of 90 around 6 weeks  Co-morbidities affecting plan of care: none  Payor: Payor: [employer] /  /  /  Billing pattern: Commercial- substantial/midpoint time each CPT  Timed Procedure Codes: 40 min, Total Time: 50  min  Modifier needed: No    Visit count: 10/30;  hard max    Chief complaints/history of injury:    Date symptoms began: 6/5/24  Nature of condition: Recent onset (initial onset within last 3 months)  Primary cause of current episode: Post-surgical  How did symptoms start: Pt reports falling from stoop on his front porch w/ resulting torn quad tendon.   He is now s/p R quad tendon repair on 6/5/24.    His sister is an OT, she is in town until Sunday     Patient Stated Goals: normalize walking and return to gym    SUBJECTIVE     Pt reports he has been more confident w/ doing his upper body gym workouts.   Knee is feeling good and he has minimal pain c/o    Medications: no changes since last session      OBJECTIVE     Functional Outcome Questionnaire:     Lower Extremity Functional Scale: 7/80= 9% function     ROM Measures:  7/9/24   Right Left Comment   Knee Extension 0 0    Knee Flexion 100 130    Hip WFL WFL    Ankle WFL WFL                Today's treatment consisted of:  Therapeutic exercise (76857) x 30 min to develop ROM, strength, endurance and flexibility of surgical knee.     Current

## 2024-07-22 ENCOUNTER — OFFICE VISIT (OUTPATIENT)
Dept: ORTHOPEDIC SURGERY | Age: 59
End: 2024-07-22

## 2024-07-22 DIAGNOSIS — Z09 STATUS POST ORTHOPEDIC SURGERY, FOLLOW-UP EXAM: Primary | ICD-10-CM

## 2024-07-22 PROCEDURE — 99024 POSTOP FOLLOW-UP VISIT: CPT | Performed by: ORTHOPAEDIC SURGERY

## 2024-07-23 ENCOUNTER — TREATMENT (OUTPATIENT)
Age: 59
End: 2024-07-23
Payer: COMMERCIAL

## 2024-07-23 DIAGNOSIS — M25.661 KNEE STIFFNESS, RIGHT: Primary | ICD-10-CM

## 2024-07-23 DIAGNOSIS — R26.9 IMPAIRED GAIT: ICD-10-CM

## 2024-07-23 DIAGNOSIS — M62.81 MUSCLE WEAKNESS: ICD-10-CM

## 2024-07-23 PROCEDURE — 97110 THERAPEUTIC EXERCISES: CPT | Performed by: PHYSICAL THERAPIST

## 2024-07-23 PROCEDURE — 97016 VASOPNEUMATIC DEVICE THERAPY: CPT | Performed by: PHYSICAL THERAPIST

## 2024-07-23 NOTE — PROGRESS NOTES
roll) 2x10  SLR 2x10 5s  TRX squats w/ TRX 3x10  RDL to 12\" box 25# 4x15  Heel slides 25x5s  Patient was educated on blood flow restriction treatment, expectations, and post-treatment instructions.    BFR contraindications: Reviewed- none noted  Patient provided verbal consent for use of BFR    Cuff size and Location Large L thigh   Arterial Occlusion Pressure 80% occlusion  130 mmHg     Exercises bridging  S/l hip ABD 3#       Wall sit quarter squat 4' total  Knee EXT iso LAQ at 60 deg 10x20s  Modified SL stance 4x15 KB presses 5#  Bridge w/ modified knee FLEX to 60 deg 20x5s  Slantboard Gastroc Stretch - 4x20s  TKE w/ grn band 2x30   Standing hip ABD, EXT, FLEX grn loop 3x10 reps  Tandem balance w/ shl press 3x45s anitra 8#           Vasopneumatic Compression (63423) with cold x 15 minutes: to surgical knee in order to reduce inflammation and swelling/joint effusion which will help improve ROM and manage pain.        ASSESSMENT     Pt displays improvement of his SLR w/ no lag however he is challenged to full EXT AROM prior to lifting from table.   He has good squat mechanics w/ TRX support and we discussed cont SLR focus and then a progressive loading from that point.    PLAN OF CARE     BFR each visit  Gradual loading  ROM as tessa  Wean from brace 8/2        Effective Dates/Duration: 6/7/2024 TO 9/5/2024 (90 days).    Frequency: 2x/week       GOALS     Goals:  Short term goals to be met by 7/5/2024  (4 weeks):  Pt will demonstrate good recall of HEP requiring minimal verbal cuing for proper form and technique. Goal Met 7/23/2024  Increase knee AROM of involved LE to 75 degrees, in order to don shoes. Goal Met 7/23/2024  Pt will amb w/ single crutch or less w/ brace locked in EXT  Goal Met 7/23/2024    Short term goals to be met by 7/19/2024  (6 weeks):  Increase knee AROM of involved LE to 90 degrees, in order to don shoes. Goal Met 7/23/2024  Pt will safely ambulate w/ no AD Goal Met 7/23/2024    Long term goals to

## 2024-07-25 ENCOUNTER — TREATMENT (OUTPATIENT)
Age: 59
End: 2024-07-25
Payer: COMMERCIAL

## 2024-07-25 DIAGNOSIS — M62.81 MUSCLE WEAKNESS: ICD-10-CM

## 2024-07-25 DIAGNOSIS — M25.661 KNEE STIFFNESS, RIGHT: Primary | ICD-10-CM

## 2024-07-25 PROCEDURE — 97110 THERAPEUTIC EXERCISES: CPT | Performed by: PHYSICAL THERAPIST

## 2024-07-25 PROCEDURE — 97016 VASOPNEUMATIC DEVICE THERAPY: CPT | Performed by: PHYSICAL THERAPIST

## 2024-07-25 NOTE — PROGRESS NOTES
[facility] - [facility]  [address] [place] [phone]  Dept: [phone]      Physical Therapy Daily Note     Referring MD: Giovanni Ugarte MD  Diagnosis:     ICD-10-CM    1. Knee stiffness, right  M25.661       2. Muscle weakness  M62.81                Surgery:Right Quadriceps Tendon Repair - Right  Date: 6/5/2024  Therapy precautions:Weight-bearing Status: in brace locked in EXT and ROM 0 to 50 degrees range of motion x 2 weeks followed by progression of flexion of 10 degrees/week with a goal of 90 around 6 weeks  Co-morbidities affecting plan of care: none  Payor: Payor: [employer] /  /  /  Billing pattern: Commercial- substantial/midpoint time each CPT  Timed Procedure Codes: 40 min, Total Time: 55  min  Modifier needed: No    Visit count: 12/30;  hard max    Chief complaints/history of injury:    Date symptoms began: 6/5/24  Nature of condition: Recent onset (initial onset within last 3 months)  Primary cause of current episode: Post-surgical  How did symptoms start: Pt reports falling from stoop on his front porch w/ resulting torn quad tendon.   He is now s/p R quad tendon repair on 6/5/24.    His sister is an OT, she is in town until Sunday     Patient Stated Goals: normalize walking and return to gym    SUBJECTIVE     Pt states he has no pain, still having some weakness and doesn't fully trust his knee.    Medications: no changes since last session      OBJECTIVE     Functional Outcome Questionnaire:     Lower Extremity Functional Scale: 7/80= 9% function     ROM Measures:  7/25/24   Right Left Comment   Knee Extension 0 0    Knee Flexion 118 130    Hip WFL WFL    Ankle WFL WFL                Today's treatment consisted of:  Therapeutic exercise (19027) x 40 min to develop ROM, strength, endurance and flexibility of surgical knee.     Current Exercises Past Exercises (not performed today)   Bike 8'  Step up 6\" 3x10  TRX squats  3x10  Leg press 130#

## 2024-07-30 ENCOUNTER — TREATMENT (OUTPATIENT)
Age: 59
End: 2024-07-30
Payer: COMMERCIAL

## 2024-07-30 DIAGNOSIS — M25.661 KNEE STIFFNESS, RIGHT: Primary | ICD-10-CM

## 2024-07-30 DIAGNOSIS — M62.81 MUSCLE WEAKNESS: ICD-10-CM

## 2024-07-30 PROCEDURE — 97110 THERAPEUTIC EXERCISES: CPT | Performed by: PHYSICAL THERAPIST

## 2024-07-30 PROCEDURE — 97016 VASOPNEUMATIC DEVICE THERAPY: CPT | Performed by: PHYSICAL THERAPIST

## 2024-07-30 NOTE — PROGRESS NOTES
3  Step up 6\" 2x15  Lateral step up 6\" 2x15  SAQ to SLR (half roll) 2x15  SLR 2x10 5s  S/l hip ABD 2x15  RDL to 12\" box 40# 4x10  HS curl machine 50# 3x10      Wall sit quarter squat 4' total  Knee EXT iso LAQ at 60 deg 10x20s  Modified SL stance 4x15 KB presses 5#  Bridge w/ modified knee FLEX to 60 deg 20x5s  Slantboard Gastroc Stretch - 4x20s  TKE w/ grn band 2x30   Standing hip ABD, EXT, FLEX grn loop 3x10 reps  Tandem balance w/ shl press 3x45s anitra 8#  Heel slides 20x5s  TRX squats  3x10  Leg press 130# 3x10  Standing HR to march at wall 2x20           Vasopneumatic Compression (67646) with cold x 10 minutes: to surgical knee in order to reduce inflammation and swelling/joint effusion which will help improve ROM and manage pain.        ASSESSMENT     Pt demonstrates good ROM improvement to 124 deg today symptom free.  He is showing appropriate tessa of progressive loading progression though his SLR cont to have mild lag w/ difficulty controlling terminal EXT.   We reviewed gym program and he will cont a gradual leg press progression w/ this    PLAN OF CARE     BFR each visit  Cont progressive loading  Emphasis on end ROM EXT  Wean from brace 8/2        Effective Dates/Duration: 6/7/2024 TO 9/5/2024 (90 days).    Frequency: 2x/week       GOALS     Goals:    Short term goals to be met by 7/19/2024  (6 weeks):  Increase knee AROM of involved LE to 90 degrees, in order to don shoes. Goal Met 7/23/2024  Pt will safely ambulate w/ no AD Goal Met 7/23/2024    Long term goals to be met by 9/5/2024 (90 days):  Demonstrate AROM of involved knee to be 5 degrees, allowing for ADLs with no restrictions related to knee stiffness.    Pt will increase LE strength to at least 85% LSI with HHD for improved stair climbing tessa.   Pt will resume sport and fitness activities including gym routine per MD guidelines.  Improve LEFS to 65/80 demonstrating no functional restrictions with ADLs, work.     [employer]  Access Code:

## 2024-08-01 ENCOUNTER — TREATMENT (OUTPATIENT)
Age: 59
End: 2024-08-01
Payer: COMMERCIAL

## 2024-08-01 DIAGNOSIS — M62.81 MUSCLE WEAKNESS: ICD-10-CM

## 2024-08-01 DIAGNOSIS — M25.661 KNEE STIFFNESS, RIGHT: Primary | ICD-10-CM

## 2024-08-01 PROCEDURE — 97016 VASOPNEUMATIC DEVICE THERAPY: CPT | Performed by: PHYSICAL THERAPIST

## 2024-08-01 PROCEDURE — 97110 THERAPEUTIC EXERCISES: CPT | Performed by: PHYSICAL THERAPIST

## 2024-08-01 NOTE — PROGRESS NOTES
[facility] - [facility]  [address] [place] [id number]  Dept: [phone]      Physical Therapy Daily Note     Referring MD: Giovanni Ugarte MD  Diagnosis:     ICD-10-CM    1. Knee stiffness, right  M25.661       2. Muscle weakness  M62.81                Surgery:Right Quadriceps Tendon Repair - Right  Date: 6/5/2024  Therapy precautions:Weight-bearing Status: in brace locked in EXT and ROM 0 to 50 degrees range of motion x 2 weeks followed by progression of flexion of 10 degrees/week with a goal of 90 around 6 weeks  Co-morbidities affecting plan of care: none  Payor: Payor: [employer] /  /  /  Billing pattern: Commercial- substantial/midpoint time each CPT  Timed Procedure Codes: 40 min, Total Time: 50  min  Modifier needed: No    Visit count: 14/30;  hard max    Chief complaints/history of injury:    Date symptoms began: 6/5/24  Nature of condition: Recent onset (initial onset within last 3 months)  Primary cause of current episode: Post-surgical  How did symptoms start: Pt reports falling from stoop on his front porch w/ resulting torn quad tendon.   He is now s/p R quad tendon repair on 6/5/24.    His sister is an OT, she is in town until Sunday     Patient Stated Goals: normalize walking and return to gym    SUBJECTIVE     Pt states overall feeling good, still weak and has some hesitancy w/ stairs    Medications: no changes since last session      OBJECTIVE     Functional Outcome Questionnaire:     Lower Extremity Functional Scale: 7/80= 9% function     ROM Measures:  7/25/24   Right Left Comment   Knee Extension 0 0    Knee Flexion 124 130    Hip WFL WFL    Ankle WFL WFL                Today's treatment consisted of:  Therapeutic exercise (16448) x 40 min to develop ROM, strength, endurance and flexibility of surgical knee.     Current Exercises Past Exercises (not performed today)   Bike 8' Lv 3  Lateral step up 6\" 3x15  SAQ to SLR (half roll) 2x15  RDL to 12\"

## 2024-08-06 ENCOUNTER — TREATMENT (OUTPATIENT)
Age: 59
End: 2024-08-06
Payer: COMMERCIAL

## 2024-08-06 DIAGNOSIS — M62.81 MUSCLE WEAKNESS: ICD-10-CM

## 2024-08-06 DIAGNOSIS — M25.661 KNEE STIFFNESS, RIGHT: Primary | ICD-10-CM

## 2024-08-06 PROCEDURE — 97016 VASOPNEUMATIC DEVICE THERAPY: CPT | Performed by: PHYSICAL THERAPIST

## 2024-08-06 PROCEDURE — 97110 THERAPEUTIC EXERCISES: CPT | Performed by: PHYSICAL THERAPIST

## 2024-08-06 NOTE — PROGRESS NOTES
[facility] - [facility]  [address] [place] [id number]  Dept: [phone]      Physical Therapy Daily Note     Referring MD: Giovanni Ugarte MD  Diagnosis:     ICD-10-CM    1. Knee stiffness, right  M25.661       2. Muscle weakness  M62.81                Surgery:Right Quadriceps Tendon Repair - Right  Date: 6/5/2024  Therapy precautions:Weight-bearing Status: in brace locked in EXT and ROM 0 to 50 degrees range of motion x 2 weeks followed by progression of flexion of 10 degrees/week with a goal of 90 around 6 weeks  Co-morbidities affecting plan of care: none  Payor: Payor: [employer] /  /  /  Billing pattern: Commercial- substantial/midpoint time each CPT  Timed Procedure Codes: 40 min, Total Time: 50  min  Modifier needed: No    Visit count: 15/30;  hard max    Chief complaints/history of injury:    Date symptoms began: 6/5/24  Nature of condition: Recent onset (initial onset within last 3 months)  Primary cause of current episode: Post-surgical  How did symptoms start: Pt reports falling from stoop on his front porch w/ resulting torn quad tendon.   He is now s/p R quad tendon repair on 6/5/24.    His sister is an OT, she is in town until Sunday     Patient Stated Goals: normalize walking and return to gym    SUBJECTIVE     Pt reports his walking is starting to improve gradually but he has some hesitancy w/ fully weaning from brace.    Medications: no changes since last session      OBJECTIVE     Functional Outcome Questionnaire:     Lower Extremity Functional Scale: 50/80= 63% function     ROM Measures:  7/25/24   Right Left Comment   Knee Extension 0 0    Knee Flexion 124 130    Hip WFL WFL    Ankle WFL WFL                Today's treatment consisted of:  Therapeutic exercise (77715) x 40 min to develop ROM, strength, endurance and flexibility of surgical knee.     Current Exercises Past Exercises (not performed today)   Bike 8' Lv 3  Lateral step up 6\" 3x15  SAQ

## 2024-08-09 ENCOUNTER — TREATMENT (OUTPATIENT)
Age: 59
End: 2024-08-09
Payer: COMMERCIAL

## 2024-08-09 DIAGNOSIS — M25.661 KNEE STIFFNESS, RIGHT: Primary | ICD-10-CM

## 2024-08-09 DIAGNOSIS — M62.81 MUSCLE WEAKNESS: ICD-10-CM

## 2024-08-09 PROCEDURE — 97016 VASOPNEUMATIC DEVICE THERAPY: CPT | Performed by: PHYSICAL THERAPIST

## 2024-08-09 PROCEDURE — 97110 THERAPEUTIC EXERCISES: CPT | Performed by: PHYSICAL THERAPIST

## 2024-08-09 NOTE — PROGRESS NOTES
[facility] - [facility]  [address] [place] [id number]  Dept: [phone]      Physical Therapy Daily Note     Referring MD: Giovanni Ugarte MD  Diagnosis:     ICD-10-CM    1. Knee stiffness, right  M25.661       2. Muscle weakness  M62.81                Surgery:Right Quadriceps Tendon Repair - Right  Date: 6/5/2024  Therapy precautions:Weight-bearing Status: in brace locked in EXT and ROM 0 to 50 degrees range of motion x 2 weeks followed by progression of flexion of 10 degrees/week with a goal of 90 around 6 weeks  Co-morbidities affecting plan of care: none  Payor: Payor: [employer] /  /  /  Billing pattern: Commercial- substantial/midpoint time each CPT  Timed Procedure Codes: 40 min, Total Time: 50  min  Modifier needed: No    Visit count: 16/30;  hard max    Chief complaints/history of injury:    Date symptoms began: 6/5/24  Nature of condition: Recent onset (initial onset within last 3 months)  Primary cause of current episode: Post-surgical  How did symptoms start: Pt reports falling from stoop on his front porch w/ resulting torn quad tendon.   He is now s/p R quad tendon repair on 6/5/24.    His sister is an OT, she is in town until Sunday     Patient Stated Goals: normalize walking and return to gym    SUBJECTIVE     Pt reports overall feeling better; he is still having quad weakness     Medications: no changes since last session      OBJECTIVE     Functional Outcome Questionnaire:     Lower Extremity Functional Scale: 50/80= 63% function     ROM Measures:  7/25/24   Right Left Comment   Knee Extension 0 0    Knee Flexion 124 130    Hip WFL WFL    Ankle WFL WFL                Today's treatment consisted of:  Therapeutic exercise (84690) x 40 min to develop ROM, strength, endurance and flexibility of surgical knee.     Current Exercises Past Exercises (not performed today)   Elliptical  8'   Lateral step up 6\" 3x15  SL RDL 3x8  SAQ to SLR  2x15  Modified

## 2024-08-13 ENCOUNTER — TREATMENT (OUTPATIENT)
Age: 59
End: 2024-08-13
Payer: COMMERCIAL

## 2024-08-13 DIAGNOSIS — M62.81 MUSCLE WEAKNESS: ICD-10-CM

## 2024-08-13 DIAGNOSIS — M25.661 KNEE STIFFNESS, RIGHT: Primary | ICD-10-CM

## 2024-08-13 PROCEDURE — 97016 VASOPNEUMATIC DEVICE THERAPY: CPT | Performed by: PHYSICAL THERAPIST

## 2024-08-13 PROCEDURE — 97110 THERAPEUTIC EXERCISES: CPT | Performed by: PHYSICAL THERAPIST

## 2024-08-13 PROCEDURE — 97140 MANUAL THERAPY 1/> REGIONS: CPT | Performed by: PHYSICAL THERAPIST

## 2024-08-13 NOTE — PROGRESS NOTES
[facility] - [facility]  [address] [place] [id number]  Dept: [phone]      Physical Therapy Daily Note     Referring MD: Giovanni Ugarte MD  Diagnosis:     ICD-10-CM    1. Knee stiffness, right  M25.661       2. Muscle weakness  M62.81                Surgery:Right Quadriceps Tendon Repair - Right  Date: 6/5/2024  Therapy precautions:Weight-bearing Status: in brace locked in EXT and ROM 0 to 50 degrees range of motion x 2 weeks followed by progression of flexion of 10 degrees/week with a goal of 90 around 6 weeks  Co-morbidities affecting plan of care: none  Payor: Payor: [employer] /  /  /  Billing pattern: Commercial- substantial/midpoint time each CPT  Timed Procedure Codes: 40 min, Total Time: 55  min  Modifier needed: No    Visit count: 17/30;  hard max    Chief complaints/history of injury:    Date symptoms began: 6/5/24  Nature of condition: Recent onset (initial onset within last 3 months)  Primary cause of current episode: Post-surgical  How did symptoms start: Pt reports falling from stoop on his front porch w/ resulting torn quad tendon.   He is now s/p R quad tendon repair on 6/5/24.    His sister is an OT, she is in town until Sunday     Patient Stated Goals: normalize walking and return to gym    SUBJECTIVE     Pt reports overall feeling better; he is still having quad weakness     Medications: no changes since last session      OBJECTIVE     Functional Outcome Questionnaire:     Lower Extremity Functional Scale: 50/80= 63% function     ROM Measures:  7/25/24   Right Left Comment   Knee Extension 0 0    Knee Flexion 124 130    Hip WFL WFL    Ankle WFL WFL                Today's treatment consisted of:  Therapeutic exercise (14114) x 30 min to develop ROM, strength, endurance and flexibility of surgical knee.     Current Exercises Past Exercises (not performed today)   Elliptical  5'   Wall sit quarter squat 4' total  RDL to 12\" box 50# 4x10  SAQ

## 2024-08-16 ENCOUNTER — TREATMENT (OUTPATIENT)
Age: 59
End: 2024-08-16

## 2024-08-16 DIAGNOSIS — M62.81 MUSCLE WEAKNESS: ICD-10-CM

## 2024-08-16 DIAGNOSIS — M25.661 KNEE STIFFNESS, RIGHT: Primary | ICD-10-CM

## 2024-08-16 NOTE — PROGRESS NOTES
[facility] - [facility]  [address] [facility] [address]  Dept: [phone]      Physical Therapy Daily Note     Referring MD: Giovanni Ugarte MD  Diagnosis:     ICD-10-CM    1. Knee stiffness, right  M25.661       2. Muscle weakness  M62.81            Surgery:Right Quadriceps Tendon Repair - Right  Date: 6/5/2024  Therapy precautions:Weight-bearing Status: in brace locked in EXT and ROM 0 to 50 degrees range of motion x 2 weeks followed by progression of flexion of 10 degrees/week with a goal of 90 around 6 weeks  Co-morbidities affecting plan of care: none  Payor: Payor: [employer] /  /  /  Billing pattern: Commercial- substantial/midpoint time each CPT  Timed Procedure Codes: 40 min, Total Time: 55  min  Modifier needed: No    Visit count: 18/30;  hard max    Chief complaints/history of injury:    Date symptoms began: 6/5/24  Nature of condition: Recent onset (initial onset within last 3 months)  Primary cause of current episode: Post-surgical  How did symptoms start: Pt reports falling from stoop on his front porch w/ resulting torn quad tendon.   He is now s/p R quad tendon repair on 6/5/24.    His sister is an OT, she is in town until Sunday     Patient Stated Goals: normalize walking and return to gym    SUBJECTIVE     Pt reports muscle soreness medial quad but     Medications: no changes since last session      OBJECTIVE     Functional Outcome Questionnaire:     Lower Extremity Functional Scale: 50/80= 63% function     ROM Measures:  7/25/24   Right Left Comment   Knee Extension 0 0    Knee Flexion 124 130    Hip WFL WFL    Ankle WFL WFL              Today's treatment consisted of:  Therapeutic exercise (26130) x 40 min to develop ROM, strength, endurance and flexibility of surgical knee.     Current Exercises Past Exercises (not performed today)   Elliptical  5'   Modified mini SL squat w/ TKE 4x10 roller tap  Modified SL squat to high table 3x10  RDL to 8\" box

## 2024-08-20 ENCOUNTER — TREATMENT (OUTPATIENT)
Age: 59
End: 2024-08-20
Payer: COMMERCIAL

## 2024-08-20 DIAGNOSIS — M62.81 MUSCLE WEAKNESS: Primary | ICD-10-CM

## 2024-08-20 DIAGNOSIS — Z74.09 IMPAIRED FUNCTIONAL MOBILITY AND ACTIVITY TOLERANCE: ICD-10-CM

## 2024-08-20 PROCEDURE — 97016 VASOPNEUMATIC DEVICE THERAPY: CPT | Performed by: PHYSICAL THERAPIST

## 2024-08-20 PROCEDURE — 97110 THERAPEUTIC EXERCISES: CPT | Performed by: PHYSICAL THERAPIST

## 2024-08-20 PROCEDURE — 97140 MANUAL THERAPY 1/> REGIONS: CPT | Performed by: PHYSICAL THERAPIST

## 2024-08-20 NOTE — PROGRESS NOTES
[facility] - [facility]  [address] [place] [id number]  Dept: [phone]      Physical Therapy Daily Note     Referring MD: Giovanni Ugarte MD  Diagnosis:     ICD-10-CM    1. Muscle weakness  M62.81       2. Impaired functional mobility and activity tolerance  Z74.09            Surgery:Right Quadriceps Tendon Repair - Right  Date: 6/5/2024  Therapy precautions:Weight-bearing Status: in brace locked in EXT and ROM 0 to 50 degrees range of motion x 2 weeks followed by progression of flexion of 10 degrees/week with a goal of 90 around 6 weeks  Co-morbidities affecting plan of care: none  Payor: Payor: [employer] /  /  /  Billing pattern: Commercial- substantial/midpoint time each CPT  Timed Procedure Codes: 40 min, Total Time: 55  min  Modifier needed: No    Visit count: 19/30;  hard max    Chief complaints/history of injury:    Date symptoms began: 6/5/24  Nature of condition: Recent onset (initial onset within last 3 months)  Primary cause of current episode: Post-surgical  How did symptoms start: Pt reports falling from stoop on his front porch w/ resulting torn quad tendon.   He is now s/p R quad tendon repair on 6/5/24.    His sister is an OT, she is in town until Sunday     Patient Stated Goals: normalize walking and return to gym    SUBJECTIVE     Pt reports muscle soreness medial quad but has been progressing with his resistance at gym    Medications: no changes since last session      OBJECTIVE     Functional Outcome Questionnaire:     Lower Extremity Functional Scale: 50/80= 63% function     ROM Measures:  7/25/24   Right Left Comment   Knee Extension 0 0    Knee Flexion 124 130    Hip WFL WFL    Ankle WFL WFL              Today's treatment consisted of:  Therapeutic exercise (37827) x 30 min to develop ROM, strength, endurance and flexibility of surgical knee.     Current Exercises Past Exercises (not performed today)   Prone quad stretch 8x15s  Wall sit w/ SB

## 2024-08-22 ENCOUNTER — TREATMENT (OUTPATIENT)
Age: 59
End: 2024-08-22
Payer: COMMERCIAL

## 2024-08-22 DIAGNOSIS — Z74.09 IMPAIRED FUNCTIONAL MOBILITY AND ACTIVITY TOLERANCE: ICD-10-CM

## 2024-08-22 DIAGNOSIS — M62.81 MUSCLE WEAKNESS: Primary | ICD-10-CM

## 2024-08-22 PROCEDURE — 97110 THERAPEUTIC EXERCISES: CPT | Performed by: PHYSICAL THERAPIST

## 2024-08-22 PROCEDURE — 97140 MANUAL THERAPY 1/> REGIONS: CPT | Performed by: PHYSICAL THERAPIST

## 2024-08-22 PROCEDURE — 97016 VASOPNEUMATIC DEVICE THERAPY: CPT | Performed by: PHYSICAL THERAPIST

## 2024-08-22 NOTE — PROGRESS NOTES
[facility] - [facility]  [address] [place] [phone]  Dept: [phone]      Physical Therapy Progress Report     Referring MD: Giovanni Ugarte MD  Diagnosis:     ICD-10-CM    1. Muscle weakness  M62.81       2. Impaired functional mobility and activity tolerance  Z74.09            Surgery:Right Quadriceps Tendon Repair - Right  Date: 6/5/2024  Therapy precautions:Weight-bearing Status: in brace locked in EXT and ROM 0 to 50 degrees range of motion x 2 weeks followed by progression of flexion of 10 degrees/week with a goal of 90 around 6 weeks  Co-morbidities affecting plan of care: none  Payor: Payor: [employer] /  /  /  Billing pattern: Commercial- substantial/midpoint time each CPT  Timed Procedure Codes: 40 min, Total Time: 55  min  Modifier needed: No    Visit count: 20/30;  hard max    Chief complaints/history of injury:    Date symptoms began: 6/5/24  Nature of condition: Recent onset (initial onset within last 3 months)  Primary cause of current episode: Post-surgical  How did symptoms start: Pt reports falling from stoop on his front porch w/ resulting torn quad tendon.   He is now s/p R quad tendon repair on 6/5/24.    His sister is an OT, she is in town until Sunday     Patient Stated Goals: normalize walking and return to gym    SUBJECTIVE     Pt reports he had a good gym session and is gaining confidence in ability to perform higher resistance at gym    Medications: no changes since last session      OBJECTIVE     Functional Outcome Questionnaire:     Lower Extremity Functional Scale: 50/80= 63% function     ROM Measures:  7/25/24   Right Left Comment   Knee Extension 0 0    Knee Flexion 130 130    Hip WFL WFL    Ankle WFL WFL                Today's treatment consisted of:  Therapeutic exercise (22844) x 30 min to develop ROM, strength, endurance and flexibility of surgical knee.     Current Exercises Past Exercises (not performed today)   Elliptical  5'

## 2024-08-28 ENCOUNTER — TREATMENT (OUTPATIENT)
Age: 59
End: 2024-08-28
Payer: COMMERCIAL

## 2024-08-28 DIAGNOSIS — Z74.09 IMPAIRED FUNCTIONAL MOBILITY AND ACTIVITY TOLERANCE: ICD-10-CM

## 2024-08-28 DIAGNOSIS — M62.81 MUSCLE WEAKNESS: Primary | ICD-10-CM

## 2024-08-28 PROCEDURE — 97110 THERAPEUTIC EXERCISES: CPT | Performed by: PHYSICAL THERAPIST

## 2024-08-28 PROCEDURE — 97016 VASOPNEUMATIC DEVICE THERAPY: CPT | Performed by: PHYSICAL THERAPIST

## 2024-08-28 NOTE — PROGRESS NOTES
[facility] - [facility]  [address] [place] [phone]  Dept: [phone]      Physical Therapy Daily Note     Referring MD: Giovanni Ugarte MD  Diagnosis:     ICD-10-CM    1. Muscle weakness  M62.81       2. Impaired functional mobility and activity tolerance  Z74.09            Surgery:Right Quadriceps Tendon Repair - Right  Date: 6/5/2024  Therapy precautions:Weight-bearing Status: in brace locked in EXT and ROM 0 to 50 degrees range of motion x 2 weeks followed by progression of flexion of 10 degrees/week with a goal of 90 around 6 weeks  Co-morbidities affecting plan of care: none  Payor: Payor: [employer] /  /  /  Billing pattern: Commercial- substantial/midpoint time each CPT  Timed Procedure Codes: 40 min, Total Time: 55  min  Modifier needed: No    Visit count: 21/30;  hard max    Chief complaints/history of injury:    Date symptoms began: 6/5/24  Nature of condition: Recent onset (initial onset within last 3 months)  Primary cause of current episode: Post-surgical  How did symptoms start: Pt reports falling from stoop on his front porch w/ resulting torn quad tendon.   He is now s/p R quad tendon repair on 6/5/24.    His sister is an OT, she is in town until Sunday     Patient Stated Goals: normalize walking and return to gym    SUBJECTIVE     Pt reports generally frustrated w/ cont quad weakness but has been consistent in gym    Medications: no changes since last session      OBJECTIVE     Functional Outcome Questionnaire:     Lower Extremity Functional Scale: 50/80= 63% function     ROM Measures:  7/25/24   Right Left Comment   Knee Extension 0 0    Knee Flexion 130 130    Hip WFL WFL    Ankle WFL WFL              Strength/MMT (0-5 scale) 8/28/2024   Right 8/28/2024  Left Comment   Hip Flexion      Hip Extension      Hip Abduction      Hip ER      Hip IR      Knee Extension 22#  Tindeq sub max   Knee Flexion      Ankle DF      Ankle PF      Ankle Inversion

## 2024-09-03 ENCOUNTER — TREATMENT (OUTPATIENT)
Age: 59
End: 2024-09-03
Payer: COMMERCIAL

## 2024-09-03 DIAGNOSIS — Z74.09 IMPAIRED FUNCTIONAL MOBILITY AND ACTIVITY TOLERANCE: ICD-10-CM

## 2024-09-03 DIAGNOSIS — M62.81 MUSCLE WEAKNESS: Primary | ICD-10-CM

## 2024-09-03 PROCEDURE — 97016 VASOPNEUMATIC DEVICE THERAPY: CPT | Performed by: PHYSICAL THERAPIST

## 2024-09-03 PROCEDURE — 97110 THERAPEUTIC EXERCISES: CPT | Performed by: PHYSICAL THERAPIST

## 2024-09-03 NOTE — PROGRESS NOTES
AROM of involved knee to be 5 degrees, allowing for ADLs with no restrictions related to knee stiffness.  Goal Met 9/3/2024  Pt will increase LE strength to at least 85% LSI with HHD for improved stair climbing tessa.   Pt will resume sport and fitness activities including gym routine per MD guidelines.  Improve LEFS to 65/80 demonstrating no functional restrictions with ADLs, work.     [employer]  Access Code: [email or web address]  URL: [email or web address]  Date: 07/26/2024  Prepared by: Leni Blount    Exercises  - Seated Hamstring Stretch with Chair  - 2 x daily - 1 sets - 6 reps - 10-15 seconds hold  - Active Straight Leg Raise with Quad Set  - 1 x daily - 4 sets - 10 reps  - Full Leg Press  - 4 x weekly - 3 sets - 10 reps  - Single Leg Stance  - 4 x weekly - 20 reps - 5-10s hold  - Squat with TRX®  - 4 x weekly - 3 sets - 10 reps

## 2024-09-09 ENCOUNTER — OFFICE VISIT (OUTPATIENT)
Dept: ORTHOPEDIC SURGERY | Age: 59
End: 2024-09-09
Payer: COMMERCIAL

## 2024-09-09 DIAGNOSIS — Z09 STATUS POST ORTHOPEDIC SURGERY, FOLLOW-UP EXAM: Primary | ICD-10-CM

## 2024-09-09 PROCEDURE — 99213 OFFICE O/P EST LOW 20 MIN: CPT | Performed by: ORTHOPAEDIC SURGERY

## 2024-09-13 ENCOUNTER — TREATMENT (OUTPATIENT)
Age: 59
End: 2024-09-13

## 2024-09-13 DIAGNOSIS — M62.81 MUSCLE WEAKNESS: Primary | ICD-10-CM

## 2024-09-13 DIAGNOSIS — Z74.09 IMPAIRED FUNCTIONAL MOBILITY AND ACTIVITY TOLERANCE: ICD-10-CM

## 2024-09-24 ENCOUNTER — TREATMENT (OUTPATIENT)
Age: 59
End: 2024-09-24
Payer: COMMERCIAL

## 2024-09-24 DIAGNOSIS — M62.81 MUSCLE WEAKNESS: Primary | ICD-10-CM

## 2024-09-24 DIAGNOSIS — Z74.09 IMPAIRED FUNCTIONAL MOBILITY AND ACTIVITY TOLERANCE: ICD-10-CM

## 2024-09-24 PROCEDURE — 97110 THERAPEUTIC EXERCISES: CPT | Performed by: PHYSICAL THERAPIST

## 2024-09-30 ENCOUNTER — TREATMENT (OUTPATIENT)
Age: 59
End: 2024-09-30
Payer: COMMERCIAL

## 2024-09-30 DIAGNOSIS — Z74.09 IMPAIRED FUNCTIONAL MOBILITY AND ACTIVITY TOLERANCE: ICD-10-CM

## 2024-09-30 DIAGNOSIS — M62.81 MUSCLE WEAKNESS: Primary | ICD-10-CM

## 2024-09-30 PROCEDURE — 97110 THERAPEUTIC EXERCISES: CPT | Performed by: PHYSICAL THERAPIST

## 2024-09-30 NOTE — PROGRESS NOTES
[facility] - [facility]  [address] [place] [phone]  Dept: [phone]      Physical Therapy Daily Note     Referring MD: Giovanni Ugarte MD  Diagnosis:     ICD-10-CM    1. Muscle weakness  M62.81       2. Impaired functional mobility and activity tolerance  Z74.09            Surgery:Right Quadriceps Tendon Repair - Right  Date: 6/5/2024  Therapy precautions:Weight-bearing Status: in brace locked in EXT and ROM 0 to 50 degrees range of motion x 2 weeks followed by progression of flexion of 10 degrees/week with a goal of 90 around 6 weeks  Co-morbidities affecting plan of care: none  Payor: Payor: [employer] /  /  /  Billing pattern: Commercial- substantial/midpoint time each CPT  Timed Procedure Codes: 40 min, Total Time: 40  min  Modifier needed: No    Visit count: 25/30;  hard max    Chief complaints/history of injury:    Date symptoms began: 6/5/24  Nature of condition: Recent onset (initial onset within last 3 months)  Primary cause of current episode: Post-surgical  How did symptoms start: Pt reports falling from stoop on his front porch w/ resulting torn quad tendon.   He is now s/p R quad tendon repair on 6/5/24.    His sister is an OT, she is in town until Sunday     Patient Stated Goals: normalize walking and return to gym    SUBJECTIVE     Pt reports overall feeling more confident in gym program.   He has felt more confident descending stairs but finds this to be the most challenging task    Medications: no changes since last session      OBJECTIVE     Functional Outcome Questionnaire:     Lower Extremity Functional Scale: 50/80= 63% function     ROM Measures:  9/3/24   Right Left Comment   Knee Extension 0 0    Knee Flexion 130 130    Hip WFL WFL    Ankle WFL WFL              Strength/MMT (0-5 scale) 9/30/2024   Right 8/28/2024  Left Comment   Hip Flexion      Hip Extension      Hip Abduction      Hip ER      Hip IR      Knee Extension 33.8#  Tindeq sub

## 2024-10-09 ENCOUNTER — TREATMENT (OUTPATIENT)
Age: 59
End: 2024-10-09
Payer: COMMERCIAL

## 2024-10-09 DIAGNOSIS — M62.81 MUSCLE WEAKNESS: Primary | ICD-10-CM

## 2024-10-09 DIAGNOSIS — Z74.09 IMPAIRED FUNCTIONAL MOBILITY AND ACTIVITY TOLERANCE: ICD-10-CM

## 2024-10-09 PROCEDURE — 97110 THERAPEUTIC EXERCISES: CPT | Performed by: PHYSICAL THERAPIST

## 2024-10-09 NOTE — PROGRESS NOTES
40 min to develop ROM, strength, endurance and flexibility of surgical knee.     Current Exercises Past Exercises (not performed today)   TRX reverse lunge slider 3x10  Lateral step up 6\" 3x15  Reverse step down 6\" 2x10  Ukrainian split squat mini w/ UE support anitra 6x6  Sled pulls simulation blue band at wall 3x60s  Knee extension isometrics tindeq 10x15s (25# hold)    Sidestep w/ squat 5x5  Iso squat hold 25# KB heels elevated 4-10x15s  Split stance RDL 40# 3x10  SL eccentric leg press 150# 2x12  10s decel to deeper range  Modified SLS w/ KB [place] 8 x10   10# kb  Lunge position TKE maroon band [id number]  SL Bridge hold 20x5s  Damaso stretch 8x15s  Split stance iso hold w/ landmine press 65# 3x10 anitra  SAQ 20x5s  Knee EXT machine SL eccentrics 3x10 10#  HS curl machine 50# 3x10  Modified split stance SL squat w/ roller tap 4x10                 ASSESSMENT     Pt is displaying improvement of eccentric control in WB positions and gradual strength progressions.    He is doing well w/ gym program and will benefit from cont open chain strength focuses    PLAN OF CARE     Eccentric and quad focused strengthening in open chain    Effective Dates/Duration: 9/5/2024 TO 11/23/2024 (60 days).    Frequency: 2x/week         GOALS     Goals:    Long term goals to be met by 11/23/2024 (90 days):  Demonstrate AROM of involved knee to be 5 degrees, allowing for ADLs with no restrictions related to knee stiffness.  Goal Met 9/3/2024  Goal Not Met 9/30/2024   D/CPt will increase LE strength to >50# HHD measurement for improved stair climbing tolPt will resume sport and fitness activities including gym routine per MD guidelines.  Improve LEFS to 65/80 demonstrating no functional restrictions with ADLs, work.     [employer]  Access Code: [email or web address]  URL: [email or web address]  Date: 07/26/2024  Prepared by: Leni Blount    Exercises  - Seated Hamstring Stretch with Chair  - 2 x daily - 1 sets - 6 reps - 10-15 seconds hold  -

## 2024-10-16 ENCOUNTER — TREATMENT (OUTPATIENT)
Age: 59
End: 2024-10-16
Payer: COMMERCIAL

## 2024-10-16 DIAGNOSIS — M62.81 MUSCLE WEAKNESS: Primary | ICD-10-CM

## 2024-10-16 DIAGNOSIS — Z74.09 IMPAIRED FUNCTIONAL MOBILITY AND ACTIVITY TOLERANCE: ICD-10-CM

## 2024-10-16 PROCEDURE — 97110 THERAPEUTIC EXERCISES: CPT | Performed by: PHYSICAL THERAPIST

## 2024-10-16 NOTE — PROGRESS NOTES
activities including gym routine per MD guidelines.  Improve LEFS to 65/80 demonstrating no functional restrictions with ADLs, work.     [employer]  Access Code: [email or web address]  URL: [email or web address]  Date: 07/26/2024  Prepared by: Leni Blount    Exercises  - Seated Hamstring Stretch with Chair  - 2 x daily - 1 sets - 6 reps - 10-15 seconds hold  - Active Straight Leg Raise with Quad Set  - 1 x daily - 4 sets - 10 reps  - Full Leg Press  - 4 x weekly - 3 sets - 10 reps  - Single Leg Stance  - 4 x weekly - 20 reps - 5-10s hold  - Squat with TRX®  - 4 x weekly - 3 sets - 10 reps

## 2024-10-30 ENCOUNTER — TREATMENT (OUTPATIENT)
Age: 59
End: 2024-10-30

## 2024-10-30 DIAGNOSIS — Z74.09 IMPAIRED FUNCTIONAL MOBILITY AND ACTIVITY TOLERANCE: ICD-10-CM

## 2024-10-30 DIAGNOSIS — M62.81 MUSCLE WEAKNESS: Primary | ICD-10-CM

## 2024-11-04 ENCOUNTER — OFFICE VISIT (OUTPATIENT)
Dept: ORTHOPEDIC SURGERY | Age: 59
End: 2024-11-04
Payer: COMMERCIAL

## 2024-11-04 DIAGNOSIS — S76.109A INJURY OF QUADRICEPS TENDON: ICD-10-CM

## 2024-11-04 DIAGNOSIS — Z09 STATUS POST ORTHOPEDIC SURGERY, FOLLOW-UP EXAM: Primary | ICD-10-CM

## 2024-11-04 PROCEDURE — 99214 OFFICE O/P EST MOD 30 MIN: CPT | Performed by: ORTHOPAEDIC SURGERY

## 2024-11-04 NOTE — PROGRESS NOTES
Name: Juan Briggs  YOB: 1965  Gender: male  MRN: [id number]      CC: Knee Pain (R)       HPI: Juan Briggs is a 59 y.o. male who returns for follow up on the right knee.  He is now 5 months status post quadriceps tendon repair on 6/5/2024.  He continues to struggle with extension strength, he has been trying to transition out of physical therapy..  He feels significant weakness compared to the contralateral side.  He does not have significant pain or swelling        Physical Examination:  General: no acute distress  Lungs: breathing easily  CV: regular rhythm by pulse  Right Knee: He does have a mild effusion.  Mild pain patellofemoral capture.  Upon palpation of the distal quad there may be a small palpable defect on the more lateral side of the insertion is extensor mechanism is intact but he has a couple degree lag.  He has good resisted strength manual muscle testing.        Assessment:     ICD-10-CM    1. Status post orthopedic surgery, follow-up exam  Z09       2. Injury of quadriceps tendon  S76.109A           Plan:   Knee extension strength substantially different from the contralateral side he does feel this is improving but very slowly.  We elected to proceed with an ultrasound today with the assistance of Dr. Carina Iglesias which demonstrates scar tissue in no obvious defect there may be less organized tissue on the lateral insertion compared to the medial insertion.  I discussed with Giovanni proceeding with MRI scan to further evaluate this versus continuing strengthening and physical therapy.  We will discuss treatment options with his physical therapist.  If he is not improving over the next 4 to 6 weeks I would recommend an MRI scan            Giovanni Ugarte MD, FAAOS  Orthopaedics and Sports Medicine

## 2024-11-19 ENCOUNTER — TREATMENT (OUTPATIENT)
Age: 59
End: 2024-11-19

## 2024-11-19 DIAGNOSIS — M62.81 MUSCLE WEAKNESS: Primary | ICD-10-CM

## 2024-11-19 DIAGNOSIS — Z74.09 IMPAIRED FUNCTIONAL MOBILITY AND ACTIVITY TOLERANCE: ICD-10-CM

## 2024-11-19 PROCEDURE — MISCDN1 MISCDN1: Performed by: PHYSICAL THERAPIST

## 2024-11-19 NOTE — PROGRESS NOTES
[facility]  [address] [place] [id number]  Dept: [phone]      Physical Therapy Progress Report     Referring MD: Giovanni Ugarte MD  Diagnosis:     ICD-10-CM    1. Muscle weakness  M62.81       2. Impaired functional mobility and activity tolerance  Z74.09            Surgery:Right Quadriceps Tendon Repair - Right  Date: 6/5/2024  Therapy precautions:Weight-bearing Status: in brace locked in EXT and ROM 0 to 50 degrees range of motion x 2 weeks followed by progression of flexion of 10 degrees/week with a goal of 90 around 6 weeks  Co-morbidities affecting plan of care: none  Payor: Payor: [employer] /  /  /  Billing pattern: Commercial- substantial/midpoint time each CPT  Timed Procedure Codes: 40 min, Total Time: 45  min  Modifier needed: No    Visit count: 29/30;  hard max    Chief complaints/history of injury:    Date symptoms began: 6/5/24  Nature of condition: Recent onset (initial onset within last 3 months)  Primary cause of current episode: Post-surgical  How did symptoms start: Pt reports falling from stoop on his front porch w/ resulting torn quad tendon.   He is now s/p R quad tendon repair on 6/5/24.    His sister is an OT, she is in town until Sunday     Patient Stated Goals: normalize walking and return to gym    SUBJECTIVE   Nature of condition: Chronic (continuous duration > 3 months)  Describe current symptoms: Pt reports he does feel that he is making some progress w/ his gym workouts.  He still feels weakness and some buckling w/ stairs and requires UE use for descending stairs    Pain Assessment:  Pain location: R knee    Average Pain/symptom intensity (0-10 scale)  Last 24 hours: 2/10  Last week (1-7 days): 4/10  How often do you feel symptoms? Occasionally (26-50%)    How much have your symptoms interfered with daily activities? A little bit  How has your condition changed since receiving care at this facility? A little better  In

## 2024-11-27 ENCOUNTER — TREATMENT (OUTPATIENT)
Age: 59
End: 2024-11-27

## 2024-11-27 DIAGNOSIS — Z74.09 IMPAIRED FUNCTIONAL MOBILITY AND ACTIVITY TOLERANCE: ICD-10-CM

## 2024-11-27 DIAGNOSIS — M62.81 MUSCLE WEAKNESS: Primary | ICD-10-CM

## 2024-11-27 NOTE — PROGRESS NOTES
[facility]  [address] [place] [id number]  Dept: [phone]      Physical Therapy Daily Note     Referring MD: Giovanni Ugarte MD  Diagnosis:     ICD-10-CM    1. Muscle weakness  M62.81       2. Impaired functional mobility and activity tolerance  Z74.09            Surgery:Right Quadriceps Tendon Repair - Right  Date: 6/5/2024  Therapy precautions:Weight-bearing Status: in brace locked in EXT and ROM 0 to 50 degrees range of motion x 2 weeks followed by progression of flexion of 10 degrees/week with a goal of 90 around 6 weeks  Co-morbidities affecting plan of care: none  Payor: Payor: [employer] /  /  /  Billing pattern: Commercial- substantial/midpoint time each CPT  Timed Procedure Codes: 45 min, Total Time: 60  min  Modifier needed: No    Visit count: 30/60;  hard max    Chief complaints/history of injury:    Date symptoms began: 6/5/24  Nature of condition: Recent onset (initial onset within last 3 months)  Primary cause of current episode: Post-surgical  How did symptoms start: Pt reports falling from stoop on his front porch w/ resulting torn quad tendon.   He is now s/p R quad tendon repair on 6/5/24.    His sister is an OT, she is in town until Sunday     Patient Stated Goals: normalize walking and return to gym    SUBJECTIVE     Pt reports cont difficulty w/ open chain strengthening but otherwise feels he is doing well       Medications: no changes since last session      OBJECTIVE     Functional Outcome Questionnaire: 10/30/24    Lower Extremity Functional Scale: 59/80= 63% function     ROM Measures:  11/19/24   Right Left Comment   Knee Extension 3 A /0 P 0    Knee Flexion 130 130    Hip WFL WFL    Ankle WFL WFL              Strength/MMT (0-5 scale) 11/19/2024   Right 8/28/2024  Left Comment   Hip Flexion      Hip Extension      Hip Abduction      Hip ER      Hip IR      Knee Extension 62.2, 72  Tindeq sub max   Knee Flexion            Today's

## 2024-12-02 ENCOUNTER — OFFICE VISIT (OUTPATIENT)
Dept: ORTHOPEDIC SURGERY | Age: 59
End: 2024-12-02
Payer: COMMERCIAL

## 2024-12-02 DIAGNOSIS — M25.561 RIGHT KNEE PAIN, UNSPECIFIED CHRONICITY: ICD-10-CM

## 2024-12-02 DIAGNOSIS — S76.109A INJURY OF QUADRICEPS TENDON: Primary | ICD-10-CM

## 2024-12-02 PROCEDURE — 99213 OFFICE O/P EST LOW 20 MIN: CPT | Performed by: ORTHOPAEDIC SURGERY

## 2024-12-02 NOTE — PROGRESS NOTES
Name: Juan Briggs  YOB: 1965  Gender: male  MRN: [id number]      CC: Knee Pain (R)       HPI: Juan Briggs is a 59 y.o. male who returns for follow up on  his right knee.  Strength gains but he still frustrated by his lack of progress in terminal extension especially        Physical Examination:  General: no acute distress  Lungs: breathing easily  CV: regular rhythm by pulse  Right Knee: I do believe I can palpate a small defect in the central aspect of his quad tendon he has good resisted strength but not symmetric to the contralateral side he has pain with that.        Assessment:     ICD-10-CM    1. Injury of quadriceps tendon  S76.109A       2. Right knee pain, unspecified chronicity  M25.561 MRI KNEE RIGHT WO CONTRAST          Plan:   He is improving but slowly.  We discussed continued rehab versus an MRI scan to evaluate for recurrent tearing.  I think there is a likelihood there is some partial recurrent tearing we will proceed with an MRI scan and review those results and intervene appropriately.  Otherwise he will continue his physical therapy and strengthening as tolerated            Giovanni Ugarte MD, FAAOS  Orthopaedics and Sports Medicine

## 2024-12-04 ENCOUNTER — TREATMENT (OUTPATIENT)
Age: 59
End: 2024-12-04
Payer: COMMERCIAL

## 2024-12-04 DIAGNOSIS — M62.81 MUSCLE WEAKNESS: Primary | ICD-10-CM

## 2024-12-04 DIAGNOSIS — Z74.09 IMPAIRED FUNCTIONAL MOBILITY AND ACTIVITY TOLERANCE: ICD-10-CM

## 2024-12-04 PROCEDURE — 97110 THERAPEUTIC EXERCISES: CPT | Performed by: PHYSICAL THERAPIST

## 2024-12-04 PROCEDURE — MISCDN1 MISCDN1: Performed by: PHYSICAL THERAPIST

## 2024-12-04 PROCEDURE — 97140 MANUAL THERAPY 1/> REGIONS: CPT | Performed by: PHYSICAL THERAPIST

## 2024-12-04 NOTE — PROGRESS NOTES
[facility] - [facility]  [address] [place] [id number]  Dept: [phone]      Physical Therapy Daily Note     Referring MD: Giovanni Ugarte MD  Diagnosis:     ICD-10-CM    1. Muscle weakness  M62.81       2. Impaired functional mobility and activity tolerance  Z74.09            Surgery:Right Quadriceps Tendon Repair - Right  Date: 6/5/2024  Therapy precautions:Weight-bearing Status: in brace locked in EXT and ROM 0 to 50 degrees range of motion x 2 weeks followed by progression of flexion of 10 degrees/week with a goal of 90 around 6 weeks  Co-morbidities affecting plan of care: none  Payor: Payor: [employer] /  /  /  Billing pattern: Commercial- substantial/midpoint time each CPT  Timed Procedure Codes: 40 min, Total Time: 50  min  Modifier needed: No    Visit count: 31/60;  hard max    Chief complaints/history of injury:    Date symptoms began: 6/5/24  Nature of condition: Recent onset (initial onset within last 3 months)  Primary cause of current episode: Post-surgical  How did symptoms start: Pt reports falling from stoop on his front porch w/ resulting torn quad tendon.   He is now s/p R quad tendon repair on 6/5/24.    His sister is an OT, she is in town until Sunday     Patient Stated Goals: normalize walking and return to gym    SUBJECTIVE     Pt reports he has MRI scheduled for Monday morning; still feels weakness.  When discussing, he does remember in September while descending stairs he slipped and landed on his butt on last couple stairs.  He feels this may be the only instance he may have had a setback    Medications: no changes since last session      OBJECTIVE     Functional Outcome Questionnaire: 10/30/24    Lower Extremity Functional Scale: 59/80= 63% function     ROM Measures:  11/19/24   Right Left Comment   Knee Extension 3 A /0 P 0    Knee Flexion 130 130    Hip WFL WFL    Ankle WFL WFL              Strength/MMT (0-5 scale) 12/4/2024   Right

## 2024-12-11 ENCOUNTER — TREATMENT (OUTPATIENT)
Age: 59
End: 2024-12-11

## 2024-12-11 DIAGNOSIS — M62.81 MUSCLE WEAKNESS: Primary | ICD-10-CM

## 2024-12-11 DIAGNOSIS — M25.661 KNEE STIFFNESS, RIGHT: ICD-10-CM

## 2024-12-11 DIAGNOSIS — Z74.09 IMPAIRED FUNCTIONAL MOBILITY AND ACTIVITY TOLERANCE: ICD-10-CM

## 2024-12-11 NOTE — PROGRESS NOTES
to knee stiffness.  Goal Met 11/19/2024  Pt will increase LE strength to >90# HHD measurement for improved stair climbing tessa Goal Not Met 11/19/2024 - ContinuePt will resume sport and fitness activities including gym routine per MD guidelines. Goal  Met 11/19/2024   Improve LEFS to 65/80 demonstrating no functional restrictions with ADLs, work.  Goal Not Met 11/19/2024 - Continue    [facility]

## 2024-12-17 ENCOUNTER — CLINICAL DOCUMENTATION (OUTPATIENT)
Dept: ORTHOPEDIC SURGERY | Age: 59
End: 2024-12-17

## 2024-12-17 NOTE — PROGRESS NOTES
I reviewed the MRI scan which shows postsurgical changes but no recurrent full-thickness or high-grade partial tearing of the quadriceps tendon insertion.  I discussed these findings with the patient over the phone recommend continued physical therapy.  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  Otherwise he will continue physical therapy and I will see him back as planned

## 2024-12-19 ENCOUNTER — TREATMENT (OUTPATIENT)
Age: 59
End: 2024-12-19

## 2024-12-19 DIAGNOSIS — Z74.09 IMPAIRED FUNCTIONAL MOBILITY AND ACTIVITY TOLERANCE: ICD-10-CM

## 2024-12-19 DIAGNOSIS — M62.81 MUSCLE WEAKNESS: Primary | ICD-10-CM

## 2024-12-19 NOTE — PROGRESS NOTES
[facility] - [facility]  [address] [place] [id number]  Dept: [phone]      Physical Therapy Daily Note     Referring MD: Giovanni Ugarte MD  Diagnosis:     ICD-10-CM    1. Muscle weakness  M62.81       2. Impaired functional mobility and activity tolerance  Z74.09                Surgery:Right Quadriceps Tendon Repair - Right  Date: 6/5/2024  Therapy precautions:Weight-bearing Status: in brace locked in EXT and ROM 0 to 50 degrees range of motion x 2 weeks followed by progression of flexion of 10 degrees/week with a goal of 90 around 6 weeks  Co-morbidities affecting plan of care: none  Payor: Payor: [employer] /  /  /  Billing pattern: Commercial- substantial/midpoint time each CPT  Timed Procedure Codes: 40 min, Total Time: 50 min  Modifier needed: No    Visit count: 33/60;  hard max    Chief complaints/history of injury:    Date symptoms began: 6/5/24  Nature of condition: Recent onset (initial onset within last 3 months)  Primary cause of current episode: Post-surgical  How did symptoms start: Pt reports falling from stoop on his front porch w/ resulting torn quad tendon.   He is now s/p R quad tendon repair on 6/5/24.    His sister is an OT, she is in town until Sunday     Patient Stated Goals: normalize walking and return to gym    SUBJECTIVE     Pt states knee doing ok, he received MRI results showing no significant quad tear.    Medications: no changes since last session      OBJECTIVE     Functional Outcome Questionnaire: 10/30/24    Lower Extremity Functional Scale: 59/80= 63% function     ROM Measures:  11/19/24   Right Left Comment   Knee Extension 3 A /0 P 0    Knee Flexion 130 130    Hip WFL WFL    Ankle WFL WFL              Strength/MMT (0-5 scale) 12/4/2024   Right 8/28/2024  Left Comment   Hip Flexion      Hip Extension      Hip Abduction      Hip ER      Hip IR      Knee Extension 71.8  Tindeq    Knee Flexion            Today's treatment consisted

## 2025-01-16 ENCOUNTER — TREATMENT (OUTPATIENT)
Age: 60
End: 2025-01-16

## 2025-01-16 DIAGNOSIS — Z74.09 IMPAIRED FUNCTIONAL MOBILITY AND ACTIVITY TOLERANCE: ICD-10-CM

## 2025-01-16 DIAGNOSIS — M62.81 MUSCLE WEAKNESS: Primary | ICD-10-CM

## 2025-01-16 NOTE — PROGRESS NOTES
[facility] - [facility]  [address] [place] [id number]  Dept: [phone]      Physical Therapy Daily Note     Referring MD: Giovanni Ugarte MD  Diagnosis:     ICD-10-CM    1. Muscle weakness  M62.81       2. Impaired functional mobility and activity tolerance  Z74.09                Surgery:Right Quadriceps Tendon Repair - Right  Date: 6/5/2024  Therapy precautions:Weight-bearing Status: in brace locked in EXT and ROM 0 to 50 degrees range of motion x 2 weeks followed by progression of flexion of 10 degrees/week with a goal of 90 around 6 weeks  Co-morbidities affecting plan of care: none  Payor: Payor: [employer] /  /  /  Billing pattern: Commercial- substantial/midpoint time each CPT  Timed Procedure Codes: 40 min, Total Time: 45 min  Modifier needed: No    Visit count: 34/60;  hard max    Chief complaints/history of injury:    Date symptoms began: 6/5/24  Nature of condition: Recent onset (initial onset within last 3 months)  Primary cause of current episode: Post-surgical  How did symptoms start: Pt reports falling from stoop on his front porch w/ resulting torn quad tendon.   He is now s/p R quad tendon repair on 6/5/24.    His sister is an OT, she is in town until Sunday     Patient Stated Goals: normalize walking and return to gym    SUBJECTIVE     Pt reports he has been cont to work on quad strengthening in gym, still struggling w/ open chain activity.   Also has difficulty descending stairs    Medications: no changes since last session      OBJECTIVE     Functional Outcome Questionnaire: 10/30/24    Lower Extremity Functional Scale: 59/80= 63% function     ROM Measures:  11/19/24   Right Left Comment   Knee Extension 3 A /0 P 0    Knee Flexion 130 130    Hip WFL WFL    Ankle WFL WFL              Strength/MMT (0-5 scale) 12/4/2024   Right 1/16/25 Comment   Hip Flexion      Hip Extension      Hip Abduction      Hip ER      Hip IR      Knee Extension 71.8 62

## 2025-01-23 ENCOUNTER — TREATMENT (OUTPATIENT)
Age: 60
End: 2025-01-23

## 2025-01-23 DIAGNOSIS — M62.81 MUSCLE WEAKNESS: Primary | ICD-10-CM

## 2025-01-23 DIAGNOSIS — Z74.09 IMPAIRED FUNCTIONAL MOBILITY AND ACTIVITY TOLERANCE: ICD-10-CM

## 2025-01-23 NOTE — PROGRESS NOTES
[facility]  [address] [place] [id number]  Dept: [phone]      Physical Therapy Daily Note     Referring MD: Giovanni Ugarte MD  Diagnosis:     ICD-10-CM    1. Muscle weakness  M62.81       2. Impaired functional mobility and activity tolerance  Z74.09                Surgery:Right Quadriceps Tendon Repair - Right  Date: 6/5/2024  Therapy precautions:Weight-bearing Status: in brace locked in EXT and ROM 0 to 50 degrees range of motion x 2 weeks followed by progression of flexion of 10 degrees/week with a goal of 90 around 6 weeks  Co-morbidities affecting plan of care: none  Payor: Payor: [employer] /  /  /  Billing pattern: Commercial- substantial/midpoint time each CPT  Timed Procedure Codes: 40 min, Total Time: 45 min  Modifier needed: No    Visit count: 35/60;  hard max    Chief complaints/history of injury:    Date symptoms began: 6/5/24  Nature of condition: Recent onset (initial onset within last 3 months)  Primary cause of current episode: Post-surgical  How did symptoms start: Pt reports falling from stoop on his front porch w/ resulting torn quad tendon.   He is now s/p R quad tendon repair on 6/5/24.    His sister is an OT, she is in town until Sunday     Patient Stated Goals: normalize walking and return to gym    SUBJECTIVE     Pt reports he felt like he had improved performance of open chain strengthening at gym    Medications: no changes since last session      OBJECTIVE     Functional Outcome Questionnaire: 10/30/24    Lower Extremity Functional Scale: 59/80= 63% function     ROM Measures:  11/19/24   Right Left Comment   Knee Extension 3 A /0 P 0    Knee Flexion 130 130    Hip WFL WFL    Ankle WFL WFL              Strength/MMT (0-5 scale) 12/4/2024   Right 1/16/25 Comment   Hip Flexion      Hip Extension      Hip Abduction      Hip ER      Hip IR      Knee Extension 71.8 62 (HHD) Tindeq    Knee Flexion            Today's treatment

## 2025-01-29 ENCOUNTER — TREATMENT (OUTPATIENT)
Age: 60
End: 2025-01-29

## 2025-01-29 DIAGNOSIS — M62.81 MUSCLE WEAKNESS: Primary | ICD-10-CM

## 2025-01-29 DIAGNOSIS — Z74.09 IMPAIRED FUNCTIONAL MOBILITY AND ACTIVITY TOLERANCE: ICD-10-CM

## 2025-01-29 NOTE — PROGRESS NOTES
[facility] - [facility]  [address] [facility] [address]  Dept: [phone]      Physical Therapy Daily Note     Referring MD: Giovanni Ugarte MD  Diagnosis:     ICD-10-CM    1. Muscle weakness  M62.81       2. Impaired functional mobility and activity tolerance  Z74.09                Surgery:Right Quadriceps Tendon Repair - Right  Date: 6/5/2024  Therapy precautions:Weight-bearing Status: in brace locked in EXT and ROM 0 to 50 degrees range of motion x 2 weeks followed by progression of flexion of 10 degrees/week with a goal of 90 around 6 weeks  Co-morbidities affecting plan of care: none  Payor: Payor: [employer] /  /  /  Billing pattern: Commercial- substantial/midpoint time each CPT  Timed Procedure Codes: 35 min, Total Time: 35 min  Modifier needed: No    Visit count: 36/60;  hard max    Chief complaints/history of injury:    Date symptoms began: 6/5/24  Nature of condition: Recent onset (initial onset within last 3 months)  Primary cause of current episode: Post-surgical  How did symptoms start: Pt reports falling from stoop on his front porch w/ resulting torn quad tendon.   He is now s/p R quad tendon repair on 6/5/24.    His sister is an OT, she is in town until Sunday     Patient Stated Goals: normalize walking and return to gym    SUBJECTIVE     Pt c/o fatigue today.   Feels that when he warms up his leg his strength improves    Medications: no changes since last session      OBJECTIVE     Functional Outcome Questionnaire: 10/30/24    Lower Extremity Functional Scale: 59/80= 63% function     ROM Measures:  11/19/24   Right Left Comment   Knee Extension 3 A /0 P 0    Knee Flexion 130 130    Hip WFL WFL    Ankle WFL WFL              Strength/MMT (0-5 scale) 12/4/2024   Right 1/16/25 Comment   Hip Flexion      Hip Extension      Hip Abduction      Hip ER      Hip IR      Knee Extension 71.8 62 (HHD) Tindeq    Knee Flexion            Today's treatment consisted

## 2025-02-06 ENCOUNTER — TREATMENT (OUTPATIENT)
Age: 60
End: 2025-02-06

## 2025-02-06 DIAGNOSIS — M62.81 MUSCLE WEAKNESS: Primary | ICD-10-CM

## 2025-02-06 DIAGNOSIS — R26.9 IMPAIRED GAIT: ICD-10-CM

## 2025-02-06 NOTE — PROGRESS NOTES
[facility] - [facility]  [address] [place] [phone]  Dept: [phone]      Physical Therapy Daily Note     Referring MD: Giovanni Ugarte MD  Diagnosis:     ICD-10-CM    1. Muscle weakness  M62.81       2. Impaired gait  R26.9                Surgery:Right Quadriceps Tendon Repair - Right  Date: 6/5/2024  Therapy precautions:Weight-bearing Status: in brace locked in EXT and ROM 0 to 50 degrees range of motion x 2 weeks followed by progression of flexion of 10 degrees/week with a goal of 90 around 6 weeks  Co-morbidities affecting plan of care: none  Payor: Payor: [employer] /  /  /  Billing pattern: Commercial- substantial/midpoint time each CPT  Timed Procedure Codes: 40 min, Total Time: 50 min  Modifier needed: No    Visit count: 37/60;  hard max    Chief complaints/history of injury:    Date symptoms began: 6/5/24  Nature of condition: Recent onset (initial onset within last 3 months)  Primary cause of current episode: Post-surgical  How did symptoms start: Pt reports falling from stoop on his front porch w/ resulting torn quad tendon.   He is now s/p R quad tendon repair on 6/5/24.    His sister is an OT, she is in town until Sunday     Patient Stated Goals: normalize walking and return to gym    SUBJECTIVE     Pt reports he has been busy traveling and has not been able to focus on his strengthening.  Does state his knee has buckled twice today, though isn't sure if it's weakness vs fatigue    Medications: no changes since last session      OBJECTIVE     Functional Outcome Questionnaire: 10/30/24    Lower Extremity Functional Scale: 59/80= 63% function     ROM Measures:  11/19/24   Right Left Comment   Knee Extension 3 A /0 P 0    Knee Flexion 130 130    Hip WFL WFL    Ankle WFL WFL              Strength/MMT (0-5 scale) 12/4/2024   Right 1/16/25 Comment   Hip Flexion      Hip Extension      Hip Abduction      Hip ER      Hip IR      Knee Extension 71.8 62 (HHD)

## 2025-02-12 ENCOUNTER — TREATMENT (OUTPATIENT)
Age: 60
End: 2025-02-12

## 2025-02-12 DIAGNOSIS — M62.81 MUSCLE WEAKNESS: Primary | ICD-10-CM

## 2025-02-12 DIAGNOSIS — R26.9 IMPAIRED GAIT: ICD-10-CM

## 2025-02-12 NOTE — PROGRESS NOTES
Today's treatment consisted of:  Therapeutic exercise (86444) x 40 min to develop ROM, strength, endurance and flexibility of surgical knee.     Current Exercises Past Exercises (not performed today)   Bike 6'  Reverse step up 6\" 3x10  Serbian squats grn band 3x10  Patient was educated on blood flow restriction treatment, expectations, and post-treatment instructions.    BFR contraindications: Reviewed- none noted  Patient provided verbal consent for use of BFR    Cuff size and Location Large cuff R at 80%   Arterial Occlusion Pressure 180 mmHg   Exercises:  Leg press 130# x 30, 150# 2x15 170# 15x  Knee EXT DL 20# x15, 30# 2x15, 40# 15x    Sidestep w/ squat 5x5  Lateral step down  Step up  Knee EXT isometric  Sled pulls simulation blue band at wall 3x60s  Knee isometric repeaters 8x10s/30s (45-50# target)  SAQ w/ NMES at EXT machine isometric [id number]  Calf stretch w/ heel prop and QS 10x10s  SAQ w/ NMES 5' 10s/10s  Lunge TKE grn band 2x10                     ASSESSMENT     Pt was able to inc resistance w/ strength exercise using BFR.  He has some cont weakness w/ stair navigation but does seem to be overall showing improvement.  Will assess strength at NV and plan to d/c to HEP    PLAN OF CARE     Quad measure next visit and d/c      Effective Dates/Duration: 11/19/2024 TO 2/3/2025 (60 days).    Frequency: 1x/week         GOALS     Goals:    Long term goals to be met by 11/23/2024 (90 days):  Demonstrate AROM of involved knee to be 5 degrees, allowing for ADLs with no restrictions related to knee stiffness.  Goal Met 11/19/2024  Pt will increase LE strength to >90# HHD measurement for improved stair climbing tessa Goal Not Met 11/19/2024 - ContinuePt will resume sport and fitness activities including gym routine per MD guidelines. Goal  Met 11/19/2024   Improve LEFS to 65/80 demonstrating no functional restrictions with ADLs, work.  Goal Not Met 11/19/2024 - Continue    [facility]

## 2025-02-19 ENCOUNTER — TREATMENT (OUTPATIENT)
Age: 60
End: 2025-02-19
Payer: COMMERCIAL

## 2025-02-19 DIAGNOSIS — M62.81 MUSCLE WEAKNESS: Primary | ICD-10-CM

## 2025-02-19 DIAGNOSIS — R26.9 IMPAIRED GAIT: ICD-10-CM

## 2025-02-19 PROCEDURE — 97110 THERAPEUTIC EXERCISES: CPT | Performed by: PHYSICAL THERAPIST

## 2025-02-19 NOTE — PROGRESS NOTES
[facility]  [address] [facility] [address]  Dept: [phone]      Physical Therapy Discharge     Referring MD: Giovanni Ugarte MD  Diagnosis:     ICD-10-CM    1. Muscle weakness  M62.81       2. Impaired gait  R26.9           Surgery:Right Quadriceps Tendon Repair - Right  Date: 6/5/2024  Therapy precautions:Weight-bearing Status: in brace locked in EXT and ROM 0 to 50 degrees range of motion x 2 weeks followed by progression of flexion of 10 degrees/week with a goal of 90 around 6 weeks  Co-morbidities affecting plan of care: none  Payor: Payor: [employer] /  /  /  Billing pattern: Commercial- substantial/midpoint time each CPT  Timed Procedure Codes: 35 min, Total Time: 35 min  Modifier needed: No    Visit count: 39/60;  hard max    Chief complaints/history of injury:    Date symptoms began: 6/5/24  Nature of condition: Recent onset (initial onset within last 3 months)  Primary cause of current episode: Post-surgical  How did symptoms start: Pt reports falling from stoop on his front porch w/ resulting torn quad tendon.   He is now s/p R quad tendon repair on 6/5/24.    His sister is an OT, she is in town until Sunday     Patient Stated Goals: normalize walking and return to gym    SUBJECTIVE     Pt reports he feels that his leg is getting stronger as he is doing more weight at the gym.  He has no pain c/o and reports that he hasn't experienced buckling at knee outside of when he was returning from a long exhausting trip to [place].    Medications: no changes since last session      OBJECTIVE     Functional Outcome Questionnaire:    Lower Extremity Functional Scale: 58/80= 73% function     ROM Measures:  11/19/24   Right Left Comment   Knee Extension 3 A /0 P 0    Knee Flexion 130 130    Hip WFL WFL    Ankle WFL WFL              Strength/MMT (0-5 scale) 12/4/2024   Right 1/16/25 2/19/25 Comment   Hip Flexion       Hip Extension       Hip Abduction

## 2025-03-17 ENCOUNTER — PATIENT MESSAGE (OUTPATIENT)
Dept: ORTHOPEDIC SURGERY | Age: 60
End: 2025-03-17

## 2025-03-19 ENCOUNTER — EVALUATION (OUTPATIENT)
Age: 60
End: 2025-03-19

## 2025-03-19 NOTE — PROGRESS NOTES
[facility]  [address] [place] [phone]  Dept: [phone]     Physical Therapy Consult       PERTINENT MEDICAL HISTORY     Past medical and surgical history:   No past medical history on file.   Past Surgical History:   Procedure Laterality Date    LEG MUSCLE SURGERY Right 6/5/2024    RIGHT QUADRICEPS TENDON REPAIR performed by Giovanni Ugarte MD at [facility]     Medications: reviewed in chart   Allergies: No Known Allergies     SUBJECTIVE     Chief complaints/history of injury: Patient is a 59 y.o. male with a PMH as noted above.  He presents to PT with c/o R arm/shl pain.  He reports on 3/15 while holding a large flower pot and throwing it into a dumpster he felt a pop in R shl.  He noticed deformity of upper arm w/ sharp pain.  He reports pain has reduced since then he was even able to get back into the gym doing OH shl press    Pain Assessment:  Pain Characteristics   Intensity: 1-2/10   Location: R upper arm/shl      Neuro screen: denies numbness, tingling, and radiating pain    OBJECTIVE       Palpation/Observation Noticeable neymar deformity   ROM  except IR limits RUE   MMT WFL          CLINICAL DECISION MAKING/ASSESSMENT     Objective findings revealed probable long head of biceps rupture, cuff strength testing was normal.   Discussed modality use for symptom management and pain control including ice and activity modification.  Recommended he refrain from UE lifting for several weeks and to limit OH lifting in the short term as well as heavy biceps work.   Also recommended he keep his appt w/ Dr. Ugarte to discuss future considerations for tx, screening for additional shl pathology and consult.     Regarding PT for his current condition, I don't see immediate need, would focus on thoracic and shl mobility.   As this is his second tendon injury in a year we discussed modification to his workout routine to focus on increased ROM activity vs heavy resistance

## 2025-03-24 ENCOUNTER — OFFICE VISIT (OUTPATIENT)
Dept: ORTHOPEDIC SURGERY | Age: 60
End: 2025-03-24
Payer: COMMERCIAL

## 2025-03-24 DIAGNOSIS — S46.111A RUPTURE OF RIGHT LONG HEAD BICEPS TENDON, INITIAL ENCOUNTER: Primary | ICD-10-CM

## 2025-03-24 DIAGNOSIS — M25.511 RIGHT SHOULDER PAIN, UNSPECIFIED CHRONICITY: ICD-10-CM

## 2025-03-24 PROCEDURE — 99214 OFFICE O/P EST MOD 30 MIN: CPT | Performed by: ORTHOPAEDIC SURGERY

## 2025-03-24 NOTE — PROGRESS NOTES
Name: Juan Briggs  YOB: 1965  Gender: male  MRN: [id number]      CC: Shoulder Pain (R)       HPI: Juan Briggs is a 59 y.o. male who presents with Shoulder Pain (R)  . He is an established patient of mine here today with a new problem of his right shoulder. He state about two weeks ago he was lifting a heavy flower pot when he felt a pop in his shoulder. He has an obvious pop eye deformity. He was evaluated by Leni Blount DPT on 03/19/25. He has no previous injuries to this shoulder    History of Present Illness  The patient presents for evaluation of left shoulder pain.    An incident occurred on 03/15/2025 while discarding a large planter pot filled with soil and plant material. During this activity, he experienced a popping sensation in his left shoulder, which was previously asymptomatic. A history of weightlifting spanning over 40 years is noted, with intermittent shoulder pain described as \"pains here and there,\" but nothing severe. Since the incident, he has been managing well, with only minor bruising noted on his left elbow.    Efforts are being made to strengthen his knee.    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 Rupture of right long head biceps tendon, initial encounter    2. Right shoulder pain, unspecified chronicity           Report: Grashey, Axillary and Scapula Y XRs of the Right shoulder demonstrates mild degenerative changes of glenohumeral joint moderate degenerative changes AC joint no acute fracture or dislocation    Impression: Chronic DJD

## 2025-03-31 NOTE — PROGRESS NOTES
Name: Juan Briggs  YOB: 1965  Gender: male  MRN: [id number]      CC: Shoulder Pain (R)     HPI: Juan Briggs is a 59 y.o. male who returns for follow up and MRI results on  his right shoulder from [facility].  He is not having any significant shoulder dysfunction as been lifting weights he has a little bit of mild cramping with single arm curls on his right arm otherwise no significant dysfunction.      Physical Examination:  General: no acute distress  Lungs: breathing easily  CV: regular rhythm by pulse  Right Shoulder: Full range of motion he does have a Anson deformity but good resisted elbow flexion strength he has no obvious asymmetry with strength testing of his rotator cuff bilaterally.    Imaging:   Reviewed MRI scan which demonstrates full long head of biceps rupture not visualized in the groove.  This rotator cuff supraspinatus and infraspinatus tendinosis with partial articular sided tearing that is less than a centimeter and is not full-thickness some degenerative changes of the AC joint and degenerative SLAP tear.  All imaging interpreted by myself Giovanni Ugarte MD independent of radiology review    Assessment:     ICD-10-CM    1. Rupture of right long head biceps tendon, initial encounter  S46.111A            Plan:   We reviewed his images together I do not think he has significant rotator cuff pathology his strength is excellent he does not have any significant pain at this point we discussed pros and cons of treatment of his biceps operatively or nonoperatively.  At this point he is getting good functional recovery he is doing everything he wants to do he just has some mild cramping.  We discussed the cramping may persist but also may improve.  We discussed details risk and benefits of biceps tenodesis possible rotator cuff repair and the recovery associated with that.  He would like to continue nonoperative management I think this is very reasonable if he has

## 2025-04-07 ENCOUNTER — OFFICE VISIT (OUTPATIENT)
Dept: ORTHOPEDIC SURGERY | Age: 60
End: 2025-04-07
Payer: COMMERCIAL

## 2025-04-07 DIAGNOSIS — S46.111A RUPTURE OF RIGHT LONG HEAD BICEPS TENDON, INITIAL ENCOUNTER: Primary | ICD-10-CM

## 2025-04-07 PROCEDURE — 99214 OFFICE O/P EST MOD 30 MIN: CPT | Performed by: ORTHOPAEDIC SURGERY

## 2025-05-29 ENCOUNTER — EVALUATION (OUTPATIENT)
Age: 60
End: 2025-05-29

## 2025-05-29 NOTE — PROGRESS NOTES
[facility]  [address] [place] [phone]  Dept: [phone]     Physical Therapy Consult     Referring MD: none  Surgery: Right Quadriceps Tendon Repair - Right   DOS:  2024     PERTINENT MEDICAL HISTORY     Past medical and surgical history:   No past medical history on file.   Past Surgical History:   Procedure Laterality Date    LEG MUSCLE SURGERY Right 2024    RIGHT QUADRICEPS TENDON REPAIR performed by Giovanni Ugarte MD at [facility]     Medications: reviewed in chart   Allergies: No Known Allergies     SUBJECTIVE     Chief complaints/history of injury: Patient is a 59 y.o. male with a PMH as noted above. Pt is returning for strength measurement of quad    OBJECTIVE       MMT Quad Tindeq seated EOB at 90 de.3 lbs     Treatment performed:  Patient was issued the following HEP::  General warm up including bike  Education:  discussed options for working back into jogging including pool progression, elliptical and un weighted w/ UE use on TM

## (undated) DEVICE — SUTURE MONOCRYL STRATAFIX SPRL + SZ 3-0 L18IN ABSRB UD PS-2 SXMP1B107

## (undated) DEVICE — ZIMMER® STERILE DISPOSABLE TOURNIQUET CUFF WITH PROTECTIVE SLEEVE, DUAL PORT, SINGLE BLADDER, 34 IN. (86 CM)

## (undated) DEVICE — Z DISCONTINUED USE 2220295 SUTURE VICRYL SZ 0 L18IN ABSRB UD L36MM CT-1 1/2 CIR J840D

## (undated) DEVICE — BANDAGE COBAN 6 IN WND 6INX5YD FOAM

## (undated) DEVICE — T-DRAPE,EXTREMITY,STERILE: Brand: MEDLINE

## (undated) DEVICE — DRESSING HYDROFIBER AQUACEL AG ADVANTAGE 3.5X6 IN

## (undated) DEVICE — SHEET, ORTHO, SPLIT, STERILE: Brand: MEDLINE

## (undated) DEVICE — ELECTRODE PT RET AD L9FT HI MOIST COND ADH HYDRGEL CORDED

## (undated) DEVICE — ZIP 16 SURGICAL SKIN CLOSURE DEVICE: Brand: ZIP 16 SURGICAL SKIN CLOSURE DEVICE

## (undated) DEVICE — 3M™ IOBAN™ 2 ANTIMICROBIAL INCISE DRAPE 6650EZ: Brand: IOBAN™ 2

## (undated) DEVICE — INTENDED FOR TISSUE SEPARATION, AND OTHER PROCEDURES THAT REQUIRE A SHARP SURGICAL BLADE TO PUNCTURE OR CUT.: Brand: BARD-PARKER ® STAINLESS STEEL BLADES

## (undated) DEVICE — SUTURE MONOCRYL SZ 2-0 L27IN ABSRB UD CP-1 1 L36MM 1/2 CIR REV Y266H

## (undated) DEVICE — SOLUTION IRRIG 1000ML 0.9% SOD CHL USP POUR PLAS BTL

## (undated) DEVICE — STOCKINETTE,IMPERVIOUS,12X48,STERILE: Brand: MEDLINE

## (undated) DEVICE — 2.4 MM X 15 INCH DRILL TIP PASSING                                    PIN, STERILE: Brand: ENDOBUTTON

## (undated) DEVICE — FOAM BUMP ROUND LARGE: Brand: MEDLINE INDUSTRIES, INC.

## (undated) DEVICE — SUTURE FIBERWIRE SZ 5 L38IN NONABSORBABLE BLU L48MM 1/2 AR7211

## (undated) DEVICE — SUTURE ETHIBOND EXCEL SZ 0 L18IN NONABSORBABLE GRN L26MM MO-6 CX45D

## (undated) DEVICE — DRAPE,U/SHT,SPLIT,FILM,60X84,STERILE: Brand: MEDLINE

## (undated) DEVICE — BANDAGE COMPR W6INXL12FT SMOOTH FOR LIMB EXSANG ESMARCH

## (undated) DEVICE — LIQUIBAND RAPID ADHESIVE 36/CS 0.8ML: Brand: MEDLINE

## (undated) DEVICE — SURGICAL PROCEDURE PACK BASIC ST FRANCIS

## (undated) DEVICE — APPLICATOR MEDICATED 26 CC SOLUTION HI LT ORNG CHLORAPREP

## (undated) DEVICE — SUTURE ETHIBOND 2 L30IN NONABSORBABLE GRN WHT LT GRN MO-7 D8793

## (undated) DEVICE — BANDAGE COMPR W6INXL15YD WHT BGE POLY COT WV E HK LOOP CLSR